# Patient Record
Sex: FEMALE | Race: WHITE | NOT HISPANIC OR LATINO | Employment: FULL TIME | URBAN - METROPOLITAN AREA
[De-identification: names, ages, dates, MRNs, and addresses within clinical notes are randomized per-mention and may not be internally consistent; named-entity substitution may affect disease eponyms.]

---
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
## 2022-01-07 DIAGNOSIS — Z11.59 SCREENING FOR VIRAL DISEASE: Primary | ICD-10-CM

## 2022-01-07 PROCEDURE — U0003 INFECTIOUS AGENT DETECTION BY NUCLEIC ACID (DNA OR RNA); SEVERE ACUTE RESPIRATORY SYNDROME CORONAVIRUS 2 (SARS-COV-2) (CORONAVIRUS DISEASE [COVID-19]), AMPLIFIED PROBE TECHNIQUE, MAKING USE OF HIGH THROUGHPUT TECHNOLOGIES AS DESCRIBED BY CMS-2020-01-R: HCPCS | Performed by: NURSE PRACTITIONER

## 2022-01-07 PROCEDURE — U0005 INFEC AGEN DETEC AMPLI PROBE: HCPCS | Performed by: NURSE PRACTITIONER

## 2022-01-11 ENCOUNTER — TELEMEDICINE (OUTPATIENT)
Dept: FAMILY MEDICINE CLINIC | Facility: CLINIC | Age: 22
End: 2022-01-11
Payer: COMMERCIAL

## 2022-01-11 VITALS — BODY MASS INDEX: 24.16 KG/M2 | WEIGHT: 145 LBS | HEIGHT: 65 IN

## 2022-01-11 DIAGNOSIS — F33.0 MILD EPISODE OF RECURRENT MAJOR DEPRESSIVE DISORDER (HCC): ICD-10-CM

## 2022-01-11 DIAGNOSIS — J32.9 SINUSITIS, UNSPECIFIED CHRONICITY, UNSPECIFIED LOCATION: Primary | ICD-10-CM

## 2022-01-11 DIAGNOSIS — F41.9 ANXIETY: ICD-10-CM

## 2022-01-11 PROCEDURE — 3008F BODY MASS INDEX DOCD: CPT | Performed by: NURSE PRACTITIONER

## 2022-01-11 PROCEDURE — 99213 OFFICE O/P EST LOW 20 MIN: CPT | Performed by: NURSE PRACTITIONER

## 2022-01-11 RX ORDER — AMOXICILLIN 875 MG/1
875 TABLET, COATED ORAL 2 TIMES DAILY
Qty: 14 TABLET | Refills: 0 | Status: SHIPPED | OUTPATIENT
Start: 2022-01-11 | End: 2022-01-17 | Stop reason: ALTCHOICE

## 2022-01-12 NOTE — PROGRESS NOTES
Virtual Regular Visit    Verification of patient location:    Patient is located in the following state in which I hold an active license [place]      Assessment/Plan:    Problem List Items Addressed This Visit        Other    Mild episode of recurrent major depressive disorder ([address] )    Anxiety      Other Visit Diagnoses     Sinusitis, unspecified chronicity, unspecified location    -  Primary    Relevant Medications    amoxicillin (AMOXIL) 875 mg tablet        Sinusitis:  Will start amoxicillin 875 mg twice daily for 7 days  Take this medication with food  Continue rest, fluids  She would like to return to work as soon as able, but we both agree she is not ready yet  She will update me in 2 days regarding how she is feeling in regards to return to work  She will call for any persisting or worsening of symptoms  Depression and anxiety are currently under good control on sertraline  Reason for visit is   Chief Complaint   Patient presents with    Cold Like Symptoms     headaches, congestion, cough, chills an sweats 1 + wk covid test neg   Virtual Regular Visit        Encounter provider ARELIS Bobby    Provider located at [address] [place] [id number]      Recent Visits  No visits were found meeting these conditions  Showing recent visits within past 7 days and meeting all other requirements  Today's Visits  Date Type Provider Dept   01/11/22 Telemedicine Ying Santana, [address] [place] [address] today's visits and meeting all other requirements  Future Appointments  No visits were found meeting these conditions  Showing future appointments within next 150 days and meeting all other requirements       The patient was identified by name and date of birth   Aureliano Friend was informed that this is a telemedicine visit and that the visit is being conducted through [facility] and patient was informed this is a secure, HIPAA-complaint platform  She agrees to proceed     My office door was closed  No one else was in the room  She acknowledged consent and understanding of privacy and security of the video platform  The patient has agreed to participate and understands they can discontinue the visit at any time  Patient is aware this is a billable service  Subjective    Fernanda Silva is a 22-year-old female presenting today for symptoms that began 6 days ago  COVID-19 swab was negative on January 7th  Symptoms include:  Headaches, cough, nasal congestion, sneezing, dizziness when 1st getting up, chills/sweats, sore throat, sinus pressure  She denies body aches, ear pain, loss of smell or taste, nausea, vomiting, diarrhea  Does not have a thermometer at home to take her temperature  Work is going well  Making a lot of new friends  Anxiety and depression are good on sertraline  Past Medical History:   Diagnosis Date    Heart murmur     In-toeing     Last assesseed 9/3/2014    Leg length discrepancy        Past Surgical History:   Procedure Laterality Date    TONSILLECTOMY         Current Outpatient Medications   Medication Sig Dispense Refill    hydrOXYzine HCL (ATARAX) 10 mg tablet Take 1 tablet (10 mg total) by mouth 3 (three) times a day as needed for anxiety 30 tablet 0    sertraline (ZOLOFT) 100 mg tablet Take 1 tablet (100 mg total) by mouth daily 90 tablet 1    amoxicillin (AMOXIL) 875 mg tablet Take 1 tablet (875 mg total) by mouth 2 (two) times a day for 7 days 14 tablet 0     No current facility-administered medications for this visit  Allergies   Allergen Reactions    Pollen Extract     Short Ragweed Pollen Ext        Review of Systems   Constitutional: Positive for chills, diaphoresis and fatigue  HENT: Positive for congestion, postnasal drip, rhinorrhea, sinus pressure, sneezing, sore throat and voice change  Negative for ear pain      Respiratory: Positive for cough  Negative for chest tightness, shortness of breath and wheezing  Cardiovascular: Negative  Gastrointestinal: Negative  Musculoskeletal: Negative for myalgias  Neurological: Positive for dizziness and headaches  Video Exam    Vitals:    01/11/22 1047   Weight: 65 8 kg (145 lb)   Height: 5' 5" (1 651 m)       Physical Exam  Vitals and nursing note reviewed  Constitutional:       Appearance: Normal appearance  Pulmonary:      Effort: Pulmonary effort is normal  No respiratory distress  Neurological:      Mental Status: She is alert  Psychiatric:         Mood and Affect: Mood normal           I spent 10 minutes directly with the patient during this visit    VIRTUAL VISIT Mark 459 verbally agrees to participate in [employer]  Pt is aware that [employer] could be limited without vital signs or the ability to perform a full hands-on physical exam  Pacheco Ross understands she or the provider may request at any time to terminate the video visit and request the patient to seek care or treatment in person

## 2022-01-13 DIAGNOSIS — J32.9 SINUSITIS, UNSPECIFIED CHRONICITY, UNSPECIFIED LOCATION: Primary | ICD-10-CM

## 2022-01-13 RX ORDER — PREDNISONE 10 MG/1
TABLET ORAL
Qty: 20 TABLET | Refills: 0 | Status: SHIPPED | OUTPATIENT
Start: 2022-01-13 | End: 2022-02-24

## 2022-01-14 ENCOUNTER — TELEPHONE (OUTPATIENT)
Dept: FAMILY MEDICINE CLINIC | Facility: CLINIC | Age: 22
End: 2022-01-14

## 2022-01-14 NOTE — TELEPHONE ENCOUNTER
Patient called, stated that she is feeling better she would like to return to work on Monday 1/17/22  She is requesting a note

## 2022-01-17 ENCOUNTER — TELEMEDICINE (OUTPATIENT)
Dept: FAMILY MEDICINE CLINIC | Facility: CLINIC | Age: 22
End: 2022-01-17
Payer: COMMERCIAL

## 2022-01-17 DIAGNOSIS — J32.9 SINUSITIS, UNSPECIFIED CHRONICITY, UNSPECIFIED LOCATION: Primary | ICD-10-CM

## 2022-01-17 PROCEDURE — 1036F TOBACCO NON-USER: CPT | Performed by: NURSE PRACTITIONER

## 2022-01-17 PROCEDURE — 99213 OFFICE O/P EST LOW 20 MIN: CPT | Performed by: NURSE PRACTITIONER

## 2022-01-17 RX ORDER — DOXYCYCLINE HYCLATE 100 MG/1
100 CAPSULE ORAL EVERY 12 HOURS SCHEDULED
Qty: 14 CAPSULE | Refills: 0 | Status: SHIPPED | OUTPATIENT
Start: 2022-01-17 | End: 2022-01-24

## 2022-01-17 NOTE — PROGRESS NOTES
Virtual Regular Visit    Verification of patient location:    Patient is located in the following state in which I hold an active license [place]      Assessment/Plan:    Problem List Items Addressed This Visit     None      Visit Diagnoses     Sinusitis, unspecified chronicity, unspecified location    -  Primary    Relevant Medications    doxycycline hyclate (VIBRAMYCIN) 100 mg capsule        Reji De Guzman is a 53-year-old female presenting today for sinusitis  Symptoms improved with amoxicillin and prednisone, but have not completely resolved  Will start doxycycline 100 mg twice daily for 7 days  She will call if symptoms are not significantly improving by the end of the week  Reason for visit is   Chief Complaint   Patient presents with    Unable to Smell    Facial Pain     When taking a deep breathe in     Virtual Regular Visit        Encounter provider [facility]    Provider located at [address] [place] [phone]      Recent Visits  Date Type Provider Dept   01/14/22 Telephone Ying Witt, [address]  [facility]    01/11/22 Telemedicine Ying Witt, [facility] recent visits within past 7 days and meeting all other requirements  Today's Visits  Date Type Provider Dept   01/17/22 Telemedicine Ying Witt, [facility] today's visits and meeting all other requirements  Future Appointments  No visits were found meeting these conditions  Showing future appointments within next 150 days and meeting all other requirements       The patient was identified by name and date of birth  Dorys Carolyn was informed that this is a telemedicine visit and that the visit is being conducted through [facility] and patient was informed this is a secure, HIPAA-complaint platform  She agrees to proceed     My office door was closed  No one else was in the room    She acknowledged consent and understanding of privacy and security of the video platform  The patient has agreed to participate and understands they can discontinue the visit at any time  Patient is aware this is a billable service  Itzel Olguin is a 25year old female presenting today for sinusitis follow up  Symptoms began January 5th  Today is day 12 of symptoms  Tested negative for COVID-19 via PCR on January 7th  See via telemedicine by myself on 1/11 and treated for sinusitis with amoxicillin  January 13th she called, with no improvement of symptoms  Started Prednisone taper  Cough, congestion, sore throat improved  Still has chills at times, no fever  Does have sinus pressure  Has sharp pain-"like needles" in nares and maxillary sinuses with inhalation  This is worse when she is active and moving around  Lost sense of smell  Not taking anything OTC  Past Medical History:   Diagnosis Date    Heart murmur     In-toeing     Last assesseed 9/3/2014    Leg length discrepancy        Past Surgical History:   Procedure Laterality Date    TONSILLECTOMY         Current Outpatient Medications   Medication Sig Dispense Refill    hydrOXYzine HCL (ATARAX) 10 mg tablet Take 1 tablet (10 mg total) by mouth 3 (three) times a day as needed for anxiety 30 tablet 0    predniSONE 10 mg tablet Take 4 tablets for 2 days, 3 tablets for 2 days, 2 tablets for 2 days, 1 tablet for 2 days  20 tablet 0    sertraline (ZOLOFT) 100 mg tablet Take 1 tablet (100 mg total) by mouth daily 90 tablet 1    doxycycline hyclate (VIBRAMYCIN) 100 mg capsule Take 1 capsule (100 mg total) by mouth every 12 (twelve) hours for 7 days 14 capsule 0     No current facility-administered medications for this visit  Allergies   Allergen Reactions    Pollen Extract     Short Ragweed Pollen Ext        Review of Systems   Constitutional: Positive for chills  Negative for diaphoresis, fatigue and fever     HENT: Positive for sinus pressure and sinus pain  Negative for congestion, ear pain, postnasal drip, sneezing and sore throat  Respiratory: Negative  Negative for cough, chest tightness, shortness of breath and wheezing  Cardiovascular: Negative  Musculoskeletal: Negative for myalgias  Neurological: Negative for dizziness and headaches  Video Exam    Vitals:       Physical Exam  Vitals and nursing note reviewed  Constitutional:       General: She is not in acute distress  Appearance: Normal appearance  She is not ill-appearing  HENT:      Head: Atraumatic  Pulmonary:      Effort: Pulmonary effort is normal  No respiratory distress  Comments: No cough  Neurological:      Mental Status: She is alert  Psychiatric:         Mood and Affect: Mood normal           I spent 8 minutes directly with the patient during this visit    VIRTUAL VISIT Mark Ozuna verbally agrees to participate in [employer]  Pt is aware that [employer] could be limited without vital signs or the ability to perform a full hands-on physical exam  Pacheco Morse understands she or the provider may request at any time to terminate the video visit and request the patient to seek care or treatment in person

## 2022-02-24 ENCOUNTER — OFFICE VISIT (OUTPATIENT)
Dept: FAMILY MEDICINE CLINIC | Facility: CLINIC | Age: 22
End: 2022-02-24
Payer: COMMERCIAL

## 2022-02-24 VITALS
HEART RATE: 117 BPM | TEMPERATURE: 97.6 F | RESPIRATION RATE: 18 BRPM | OXYGEN SATURATION: 97 % | HEIGHT: 65 IN | DIASTOLIC BLOOD PRESSURE: 80 MMHG | SYSTOLIC BLOOD PRESSURE: 112 MMHG | BODY MASS INDEX: 24.13 KG/M2

## 2022-02-24 DIAGNOSIS — L60.0 INGROWN TOENAIL OF RIGHT FOOT: Primary | ICD-10-CM

## 2022-02-24 PROCEDURE — 3008F BODY MASS INDEX DOCD: CPT | Performed by: NURSE PRACTITIONER

## 2022-02-24 PROCEDURE — 99213 OFFICE O/P EST LOW 20 MIN: CPT | Performed by: FAMILY MEDICINE

## 2022-02-24 RX ORDER — CEPHALEXIN 500 MG/1
500 CAPSULE ORAL 3 TIMES DAILY
Qty: 30 CAPSULE | Refills: 0 | Status: SHIPPED | OUTPATIENT
Start: 2022-02-24 | End: 2022-03-06

## 2022-02-24 NOTE — PROGRESS NOTES
FAMILY PRACTICE OFFICE VISIT       NAME: Rozina Lopez  AGE: 25 y o  SEX: female       : 2000        MRN: [id number]        Assessment and Plan     1  Ingrown toenail of right foot  -     Ambulatory referral to Podiatry; Future  -     cephalexin (KEFLEX) 500 mg capsule; Take 1 capsule (500 mg total) by mouth 3 (three) times a day for 10 days    Patient presents for evaluation of infected ingrown right big toenail  Patient will continue soaking  I advised her to elevate right foot  Work note provided  Start Keflex 500 mg t i d  for 7 to 10 days  Referral to Podiatry  There are no Patient Instructions on file for this visit  No follow-ups on file  Discussed with the patient and all questioned fully answered  She will call me if any problems arise  M*Modal software was used to dictate this note  It may contain errors with dictating incorrect words/spelling  Please contact provider directly with any questions  Chief Complaint     Chief Complaint   Patient presents with    Toe Pain     Right Great Toe X1 week    Covid-19 Vaccine     Booster due would like to discuss       History of Present Illness     Patient presents for evaluation of painful right big toe  Symptoms started a week ago  Patient states that she is standing at work all day long and is wearing rather heavy boots  She has try soaking her toe  No other arthralgias or myalgias  Patient is afebrile  Toe Pain         Review of Systems   Review of Systems   Constitutional: Negative  HENT: Negative  Eyes: Negative  Respiratory: Negative  Cardiovascular: Negative  Gastrointestinal: Negative  Endocrine: Negative  Genitourinary: Negative  Musculoskeletal: Positive for arthralgias  Skin: Positive for color change         Active Problem List     Patient Active Problem List   Diagnosis    History of asthma    Exercise-induced asthma    Mild episode of recurrent major depressive disorder ([facility] [address] )    Anxiety       Past Medical History:  Past Medical History:   Diagnosis Date    Heart murmur     In-toeing     Last assesseed 9/3/2014    Leg length discrepancy        Past Surgical History:  Past Surgical History:   Procedure Laterality Date    TONSILLECTOMY         Family History:  Family History   Problem Relation Age of Onset    Mental illness Mother         anxiety, depression    Hypothyroidism Mother     Hypertension Father     Coronary artery disease Father     Other Father         Hypoalbuminemia    Mental illness Sister         anxiety, depression    Autism spectrum disorder Brother     Heart attack Paternal Grandfather     Diabetes Paternal Grandfather     Diabetes Family     Heart disease Family     Obesity Family        Social History:  Social History     Socioeconomic History    Marital status: Single     Spouse name: Not on file    Number of children: Not on file    Years of education: Not on file    Highest education level: Not on file   Occupational History    Not on file   Tobacco Use    Smoking status: Never Smoker    Smokeless tobacco: Never Used   Vaping Use    Vaping Use: Never used   Substance and Sexual Activity    Alcohol use: No    Drug use: No    Sexual activity: Never   Other Topics Concern    Not on file   Social History Narrative    Exercises regularly     Social Determinants of Health     Financial Resource Strain: Not on file   Food Insecurity: Not on file   Transportation Needs: Not on file   Physical Activity: Not on file   Stress: Not on file   Social Connections: Not on file   Intimate Partner Violence: Not on file   Housing Stability: Not on file         Objective     Vitals:    02/24/22 1127   BP: 112/80   BP Location: Left arm   Patient Position: Sitting   Cuff Size: Standard   Pulse: (!) 117   Resp: 18   Temp: 97 6 °F (36 4 °C)   TempSrc: Temporal   SpO2: 97%   Height: 5' 5" (1 651 m)       Wt Readings from Last 3 Encounters:   01/11/22 65 8 kg (145 lb) (77 %, Z= 0 74)*   11/30/21 66 kg (145 lb 8 1 oz) (78 %, Z= 0 77)*   11/12/21 66 kg (145 lb 6 4 oz) (78 %, Z= 0 77)*     * Growth percentiles are based on [facility] (Girls, 2-20 Years) data  Physical Exam  Vitals and nursing note reviewed  Constitutional:       Appearance: Normal appearance  Skin:     Comments: Erythema and tenderness at lateral border of right big toe  Small pustular collection, probed with 23 gauge needle, no pus extruded  Ingrown toenail  Neurological:      General: No focal deficit present  Mental Status: She is alert and oriented to person, place, and time     Psychiatric:         Mood and Affect: Mood normal          Behavior: Behavior normal           Pertinent Laboratory/Diagnostic Studies:    Lab Results   Component Value Date    WBC 4 9 07/22/2021    HGB 13 0 07/22/2021    HCT 39 4 07/22/2021    MCV 90 2 07/22/2021     07/22/2021       Lab Results   Component Value Date    TSH 2 29 07/22/2021       No results found for: CHOL  No results found for: TRIG  No results found for: HDL  No results found for: [facility]  Lab Results   Component Value Date    HGBA1C 4 8 10/12/2020     Lab Results   Component Value Date    SODIUM 138 07/22/2021    K 4 4 07/22/2021     07/22/2021    CO2 27 07/22/2021    BUN 12 07/22/2021    CREATININE 0 62 07/22/2021    GLUC 85 07/22/2021    CALCIUM 9 6 07/22/2021    AST 26 07/22/2021    ALT 24 07/22/2021    ALKPHOS 88 07/22/2021    PROT 7 0 07/22/2016    TP 7 5 07/22/2021    BILITOT 0 5 07/22/2016    TBILI 0 5 07/22/2021       Orders Placed This Encounter   Procedures    Ambulatory referral to Podiatry       ALLERGIES:  Allergies   Allergen Reactions    Pollen Extract     Short Ragweed Pollen Ext        Current Medications     Current Outpatient Medications   Medication Sig Dispense Refill    hydrOXYzine HCL (ATARAX) 10 mg tablet Take 1 tablet (10 mg total) by mouth 3 (three) times a day as needed for anxiety 30 tablet 0    sertraline (ZOLOFT) 100 mg tablet Take 1 tablet (100 mg total) by mouth daily 90 tablet 1    cephalexin (KEFLEX) 500 mg capsule Take 1 capsule (500 mg total) by mouth 3 (three) times a day for 10 days 30 capsule 0     No current facility-administered medications for this visit         Medications Discontinued During This Encounter   Medication Reason    predniSONE 10 mg tablet        Health Maintenance     Health Maintenance   Topic Date Due    Hepatitis C Screening  Never done    Pneumococcal Vaccine: Pediatrics (0 to 5 Years) and At-Risk Patients (6 to 59 Years) (1 of 2 - PPSV23) 11/26/2008    HIV Screening  Never done    Annual Physical  09/25/2021    COVID-19 Vaccine (3 - Booster for Pfizer series) 12/10/2021    BMI: Adult  01/11/2023    Depression Remission PHQ  02/24/2023    DTaP,Tdap,and Td Vaccines (7 - Td or Tdap) 09/05/2024    HIB Vaccine  Completed    Hepatitis B Vaccine  Completed    IPV Vaccine  Completed    Hepatitis A Vaccine  Completed    Meningococcal ACWY Vaccine  Completed    Influenza Vaccine  Completed    HPV Vaccine  Completed       Immunization History   Administered Date(s) Administered    COVID-19 PFIZER VACCINE 0 3 ML IM 06/19/2021, 07/10/2021    DTaP 02/05/2003, 03/26/2003, 06/07/2003, 03/01/2004, 01/22/2007    DTaP 5 02/05/2003, 03/26/2003, 06/07/2003, 03/01/2004, 01/22/2007    HPV Quadrivalent 09/05/2014, 11/18/2014, 03/23/2015    Hep A, adult 09/12/2013, 09/05/2014    Hep B, Adolescent or Pediatric 2002, 01/06/2003, 06/07/2003    Hep B, adult 2002, 01/06/2003, 06/07/2003    Hepatitis A 09/12/2013    HiB 02/05/2003, 03/26/2003, 06/07/2003, 03/01/2004    Hib (PRP-OMP) 02/05/2003, 03/26/2003, 06/07/2003, 03/01/2004    INFLUENZA 11/13/2008, 11/17/2010, 09/22/2011, 10/23/2017    IPV 02/05/2003, 03/26/2003, 06/07/2003, 01/22/2007    Influenza Quadrivalent Preservative Free 3 years and older IM 09/21/2016, 10/23/2017  Influenza, injectable, quadrivalent, preservative free 0 5 mL 10/09/2018, 09/23/2019, 09/25/2020, 09/23/2021    Influenza, seasonal, injectable 11/13/2008, 11/17/2010, 09/22/2011, 11/18/2014, 10/27/2015    MMR 12/16/2003, 01/22/2007    Meningococcal MCV4P 04/19/2019    Meningococcal, Unknown Serogroups 09/05/2014    Pneumococcal Conjugate PCV 7 02/05/2003, 03/26/2003, 06/07/2003, 12/16/2003    Tdap 09/05/2014    Varicella 12/16/2003, 02/15/2008       Benny Jalloh MD

## 2022-03-22 DIAGNOSIS — F41.9 ANXIETY: ICD-10-CM

## 2022-03-22 DIAGNOSIS — F33.0 MILD EPISODE OF RECURRENT MAJOR DEPRESSIVE DISORDER (HCC): ICD-10-CM

## 2022-03-22 RX ORDER — SERTRALINE HYDROCHLORIDE 100 MG/1
100 TABLET, FILM COATED ORAL DAILY
Qty: 90 TABLET | Refills: 0 | Status: SHIPPED | OUTPATIENT
Start: 2022-03-22 | End: 2022-03-22 | Stop reason: SDUPTHER

## 2022-03-22 RX ORDER — SERTRALINE HYDROCHLORIDE 100 MG/1
100 TABLET, FILM COATED ORAL DAILY
Qty: 90 TABLET | Refills: 0 | Status: SHIPPED | OUTPATIENT
Start: 2022-03-22 | End: 2022-05-17 | Stop reason: SDUPTHER

## 2022-03-31 ENCOUNTER — OFFICE VISIT (OUTPATIENT)
Dept: FAMILY MEDICINE CLINIC | Facility: CLINIC | Age: 22
End: 2022-03-31
Payer: COMMERCIAL

## 2022-03-31 VITALS
HEIGHT: 65 IN | DIASTOLIC BLOOD PRESSURE: 70 MMHG | RESPIRATION RATE: 16 BRPM | WEIGHT: 142 LBS | BODY MASS INDEX: 23.66 KG/M2 | OXYGEN SATURATION: 98 % | HEART RATE: 95 BPM | TEMPERATURE: 97.8 F | SYSTOLIC BLOOD PRESSURE: 100 MMHG

## 2022-03-31 DIAGNOSIS — K52.9 GASTROENTERITIS: Primary | ICD-10-CM

## 2022-03-31 PROBLEM — Z87.09 HISTORY OF ASTHMA: Status: RESOLVED | Noted: 2019-02-26 | Resolved: 2022-03-31

## 2022-03-31 PROCEDURE — 99213 OFFICE O/P EST LOW 20 MIN: CPT | Performed by: FAMILY MEDICINE

## 2022-03-31 PROCEDURE — 3008F BODY MASS INDEX DOCD: CPT | Performed by: FAMILY MEDICINE

## 2022-03-31 PROCEDURE — 1036F TOBACCO NON-USER: CPT | Performed by: FAMILY MEDICINE

## 2022-03-31 RX ORDER — ONDANSETRON 4 MG/1
4 TABLET, FILM COATED ORAL EVERY 6 HOURS PRN
Qty: 20 TABLET | Refills: 1 | Status: SHIPPED | OUTPATIENT
Start: 2022-03-31

## 2022-03-31 NOTE — PROGRESS NOTES
Chief Complaint   Patient presents with    Abdominal Pain     3 + days    Fatigue     3 + days        HPI   Here with 3 days of funky Nissen her stomach  Some nausea  Has not vomited  No fever or diarrhea  Other complaint is fatigue  Head feels fuzzy  A bit of pressure in her head  Not headache  Has missed 2 days of work  Past Medical History:   Diagnosis Date    Heart murmur     In-toeing     Leg length discrepancy         Past Surgical History:   Procedure Laterality Date    TONSILLECTOMY         Social History     Tobacco Use    Smoking status: Never Smoker    Smokeless tobacco: Never Used   Substance Use Topics    Alcohol use: No       Social History     Social History Narrative    Lives at home with parents  One brother  Works at [employer]  [employer]  Has 2 dogs  The following portions of the patient's history were reviewed and updated as appropriate: allergies, current medications, past family history, past medical history, past social history, past surgical history and problem list       Review of Systems       /70   Pulse 95   Temp 97 8 °F (36 6 °C) (Temporal)   Resp 16   Ht 5' 5" (1 651 m)   Wt 64 4 kg (142 lb)   LMP 12/03/2021 (Exact Date)   SpO2 98%   BMI 23 63 kg/m²      Physical Exam   Appears well  Both eardrums are white  Throat reveals no erythema  Lungs are clear  Heart regular  Abdomen soft and nontender with normal bowel sounds  No rash noted                Current Outpatient Medications:     hydrOXYzine HCL (ATARAX) 10 mg tablet, Take 1 tablet (10 mg total) by mouth 3 (three) times a day as needed for anxiety, Disp: 30 tablet, Rfl: 0    sertraline (ZOLOFT) 100 mg tablet, Take 1 tablet (100 mg total) by mouth daily, Disp: 90 tablet, Rfl: 0    ondansetron (ZOFRAN) 4 mg tablet, Take 1 tablet (4 mg total) by mouth every 6 (six) hours as needed for nausea or vomiting, Disp: 20 tablet, Rfl: 1     No problem-specific Assessment & Plan notes found for this encounter  Diagnoses and all orders for this visit:    Gastroenteritis  -     ondansetron (ZOFRAN) 4 mg tablet; Take 1 tablet (4 mg total) by mouth every 6 (six) hours as needed for nausea or vomiting        Patient Instructions   Has a viral illness, possibly gastroenteritis  Ondansetron may be used every 4 hours if needed for nausea  Clear liquids and gradually advance diet as tolerated  Avoid milk products for a couple days  Tylenol or ibuprofen for aches and pains  The fatigue needs to run its course  No to return to work tomorrow if she is feeling better  Return as needed

## 2022-03-31 NOTE — PATIENT INSTRUCTIONS
Has a viral illness, possibly gastroenteritis  Ondansetron may be used every 4 hours if needed for nausea  Clear liquids and gradually advance diet as tolerated  Avoid milk products for a couple days  Tylenol or ibuprofen for aches and pains  The fatigue needs to run its course  No to return to work tomorrow if she is feeling better  Return as needed

## 2022-03-31 NOTE — LETTER
March 31, 2022     Patient: Bernadette Velez   YOB: 2000   Date of Visit: 3/31/2022       To Whom it May Concern:    Zaira Lakhani is under my professional care  She was seen in my office on 3/31/2022   Please excuse from work 3/30-31  May return to work 4/1 if feeling better  If you have any questions or concerns, please don't hesitate to call           Sincerely,          Kirsty Estrella MD        CC: No Recipients

## 2022-05-17 DIAGNOSIS — F41.9 ANXIETY: ICD-10-CM

## 2022-05-17 DIAGNOSIS — F33.0 MILD EPISODE OF RECURRENT MAJOR DEPRESSIVE DISORDER (HCC): ICD-10-CM

## 2022-05-17 RX ORDER — SERTRALINE HYDROCHLORIDE 100 MG/1
100 TABLET, FILM COATED ORAL DAILY
Qty: 90 TABLET | Refills: 1 | Status: SHIPPED | OUTPATIENT
Start: 2022-05-17

## 2022-11-17 ENCOUNTER — CLINICAL SUPPORT (OUTPATIENT)
Dept: FAMILY MEDICINE CLINIC | Facility: CLINIC | Age: 22
End: 2022-11-17

## 2022-11-17 DIAGNOSIS — Z20.822 CLOSE EXPOSURE TO COVID-19 VIRUS: Primary | ICD-10-CM

## 2022-11-18 LAB — SARS-COV-2 RNA RESP QL NAA+PROBE: NEGATIVE

## 2022-11-25 ENCOUNTER — CLINICAL SUPPORT (OUTPATIENT)
Dept: FAMILY MEDICINE CLINIC | Facility: CLINIC | Age: 22
End: 2022-11-25

## 2022-11-25 DIAGNOSIS — B34.9 VIRAL INFECTION, UNSPECIFIED: Primary | ICD-10-CM

## 2022-11-26 LAB — SARS-COV-2 RNA RESP QL NAA+PROBE: NEGATIVE

## 2022-12-27 ENCOUNTER — OFFICE VISIT (OUTPATIENT)
Dept: URGENT CARE | Facility: CLINIC | Age: 22
End: 2022-12-27

## 2022-12-27 VITALS
WEIGHT: 160 LBS | TEMPERATURE: 99.9 F | HEART RATE: 104 BPM | OXYGEN SATURATION: 99 % | BODY MASS INDEX: 26.66 KG/M2 | HEIGHT: 65 IN | RESPIRATION RATE: 18 BRPM

## 2022-12-27 DIAGNOSIS — R05.1 ACUTE COUGH: Primary | ICD-10-CM

## 2022-12-27 RX ORDER — BENZONATATE 200 MG/1
200 CAPSULE ORAL 3 TIMES DAILY PRN
Qty: 20 CAPSULE | Refills: 0 | Status: SHIPPED | OUTPATIENT
Start: 2022-12-27

## 2022-12-27 RX ORDER — ALBUTEROL SULFATE 90 UG/1
2 AEROSOL, METERED RESPIRATORY (INHALATION) EVERY 6 HOURS PRN
Qty: 8 G | Refills: 0 | Status: SHIPPED | OUTPATIENT
Start: 2022-12-27

## 2022-12-27 RX ORDER — FLUTICASONE PROPIONATE 50 MCG
2 SPRAY, SUSPENSION (ML) NASAL DAILY
Qty: 16 G | Refills: 0 | Status: SHIPPED | OUTPATIENT
Start: 2022-12-27

## 2022-12-27 NOTE — PROGRESS NOTES
[facility]        NAME: Jovany Motta is a 23 y o  female  : 2000    MRN: [id number]  DATE: 2022  TIME: 8:09 AM    Assessment and Plan   Acute cough [R05 1]  1  Acute cough  Cov/Flu-Col[facility] at [facility]    albuterol (PROVENTIL HFA,VENTOLIN HFA) 90 mcg/act inhaler    benzonatate (TESSALON) 200 MG capsule    fluticasone (FLONASE) 50 mcg/act nasal spray            Patient Instructions     Use medications as directed for symptomatic relief as needed  Return and/or Tylenol as needed for fever or pain  Follow up with PCP in 3-5 days  Proceed to  ER if symptoms worsen  Chief Complaint     Chief Complaint   Patient presents with   • Cough     Pt presents with feeling run down and tired, post nasal drip, nasal congestion, and ear fullness on the right side only x 2 days  Pt states her mother currently has the flu and brother had last week  History of Present Illness       24-year-old female presents with 2-day history of body aches fatigue nasal drip with congestion ear discomfort mild cough  Patient stated mother and brother have flu recently  Denies any chest pain shortness of breath  No abdominal pain nausea vomiting or diarrhea  Cough  This is a new problem  The current episode started yesterday  The problem has been unchanged  The problem occurs every few minutes  The cough is non-productive  Associated symptoms include ear congestion, ear pain, myalgias, nasal congestion, postnasal drip, rhinorrhea and a sore throat  Pertinent negatives include no chest pain, chills, fever or shortness of breath  The symptoms are aggravated by lying down  She has tried OTC cough suppressant for the symptoms  The treatment provided no relief  Review of Systems   Review of Systems   Constitutional: Negative  Negative for chills and fever  HENT: Positive for ear pain, postnasal drip, rhinorrhea and sore throat  Eyes: Negative      Respiratory: Positive for cough  Negative for shortness of breath  Cardiovascular: Negative  Negative for chest pain  Gastrointestinal: Negative  Musculoskeletal: Positive for myalgias  Skin: Negative  Neurological: Negative            Current Medications       Current Outpatient Medications:   •  albuterol (PROVENTIL HFA,VENTOLIN HFA) 90 mcg/act inhaler, Inhale 2 puffs every 6 (six) hours as needed for wheezing or shortness of breath, Disp: 8 g, Rfl: 0  •  benzonatate (TESSALON) 200 MG capsule, Take 1 capsule (200 mg total) by mouth 3 (three) times a day as needed for cough, Disp: 20 capsule, Rfl: 0  •  fluticasone (FLONASE) 50 mcg/act nasal spray, 2 sprays into each nostril daily, Disp: 16 g, Rfl: 0  •  hydrOXYzine HCL (ATARAX) 10 mg tablet, Take 1 tablet (10 mg total) by mouth 3 (three) times a day as needed for anxiety, Disp: 30 tablet, Rfl: 0  •  sertraline (ZOLOFT) 100 mg tablet, Take 1 tablet (100 mg total) by mouth in the morning , Disp: 90 tablet, Rfl: 1  •  ondansetron (ZOFRAN) 4 mg tablet, Take 1 tablet (4 mg total) by mouth every 6 (six) hours as needed for nausea or vomiting, Disp: 20 tablet, Rfl: 1    Current Allergies     Allergies as of 12/27/2022 - Reviewed 12/27/2022   Allergen Reaction Noted   • Pollen extract  09/05/2014   • Short ragweed pollen ext  09/05/2014            The following portions of the patient's history were reviewed and updated as appropriate: allergies, current medications, past family history, past medical history, past social history, past surgical history and problem list      Past Medical History:   Diagnosis Date   • Heart murmur    • In-toeing    • Leg length discrepancy        Past Surgical History:   Procedure Laterality Date   • TONSILLECTOMY         Family History   Problem Relation Age of Onset   • Mental illness Mother         anxiety, depression   • Hypothyroidism Mother    • Hypertension Father    • Coronary artery disease Father    • Other Father         Hypoalbuminemia • Mental illness Sister         anxiety, depression   • Autism spectrum disorder Brother    • Heart attack Paternal Grandfather    • Diabetes Paternal Grandfather    • Diabetes Family    • Heart disease Family    • Obesity Family          Medications have been verified  Objective   Pulse 104   Temp 99 9 °F (37 7 °C)   Resp 18   Ht 5' 5" (1 651 m)   Wt 72 6 kg (160 lb)   SpO2 99%   BMI 26 63 kg/m²   No LMP recorded  Physical Exam     Physical Exam  Vitals and nursing note reviewed  Constitutional:       General: She is not in acute distress  Appearance: Normal appearance  She is well-developed  She is ill-appearing  HENT:      Head: Normocephalic and atraumatic  Right Ear: Hearing, tympanic membrane, ear canal and external ear normal  There is no impacted cerumen  Left Ear: Hearing, tympanic membrane, ear canal and external ear normal  There is no impacted cerumen  Nose: Congestion and rhinorrhea present  Mouth/Throat:      Pharynx: Uvula midline  Posterior oropharyngeal erythema (Mild posterior) present  No oropharyngeal exudate  Eyes:      General:         Right eye: No discharge  Left eye: No discharge  Conjunctiva/sclera: Conjunctivae normal    Cardiovascular:      Rate and Rhythm: Normal rate and regular rhythm  Heart sounds: Normal heart sounds  No murmur heard  Pulmonary:      Effort: Pulmonary effort is normal  No respiratory distress  Breath sounds: Normal breath sounds  No wheezing or rales  Abdominal:      General: Bowel sounds are normal       Palpations: Abdomen is soft  Tenderness: There is no abdominal tenderness  Musculoskeletal:         General: Normal range of motion  Cervical back: Normal range of motion and neck supple  Lymphadenopathy:      Cervical: No cervical adenopathy  Skin:     General: Skin is warm and dry  Neurological:      Mental Status: She is alert and oriented to person, place, and time  Psychiatric:         Mood and Affect: Mood normal

## 2022-12-27 NOTE — LETTER
December 27, 2022     Patient: Sol Mendoza   YOB: 2000   Date of Visit: 12/27/2022       To Whom it May Concern:    Allyson Krishnan was seen in my clinic on 12/27/2022  She may return to work on 12/31/2022  Patient may return sooner if symptoms have improved and no fevers for 24 hours  If you have any questions or concerns, please don't hesitate to call           Sincerely,          Tone Stratton, PATomasC        CC: No Recipients

## 2022-12-27 NOTE — PATIENT INSTRUCTIONS
Use medications as directed for symptomatic relief as needed  Return and/or Tylenol as needed for fever or pain  Follow up with PCP in 3-5 days  Proceed to  ER if symptoms worsen  Influenza   AMBULATORY CARE:   Influenza  (the flu) is an infection caused by the influenza virus  The flu is easily spread when an infected person coughs, sneezes, or has close contact with others  You may be able to spread the flu to others for 1 week or longer after signs or symptoms appear  Common signs and symptoms include the following:   Fever and chills    Headaches, body aches, and muscle or joint pain    Cough, runny nose, and sore throat    Loss of appetite, nausea, vomiting, or diarrhea    Tiredness    Trouble breathing    Call your local emergency number (911 in the [address] [facility]) if:   You have trouble breathing, and your lips look purple or blue  You have a seizure  Seek care immediately if:   You are dizzy, or you are urinating less or not at all  You have a headache with a stiff neck, and you feel tired or confused  You have new pain or pressure in your chest     Your symptoms, such as shortness of breath, vomiting, or diarrhea, get worse  Your symptoms, such as fever and coughing, seem to get better, but then get worse  Call your doctor if:   You have new muscle pain or weakness  You have questions or concerns about your condition or care  Treatment for influenza  may include any of the following:  Acetaminophen  decreases pain and fever  It is available without a doctor's order  Ask how much to take and how often to take it  Follow directions  Read the labels of all other medicines you are using to see if they also contain acetaminophen, or ask your doctor or pharmacist  Acetaminophen can cause liver damage if not taken correctly  Do not use more than 4 grams (4,000 milligrams) total of acetaminophen in one day  NSAIDs , such as ibuprofen, help decrease swelling, pain, and fever   This medicine is available with or without a doctor's order  NSAIDs can cause stomach bleeding or kidney problems in certain people  If you take blood thinner medicine, always ask your healthcare provider if NSAIDs are safe for you  Always read the medicine label and follow directions  Antivirals  help fight a viral infection  Manage your symptoms:   Rest  as much as you can to help you recover  Drink liquids as directed  to help prevent dehydration  Ask how much liquid to drink each day and which liquids are best for you  Prevent the spread of germs:       Wash your hands often  Wash your hands several times each day  Wash after you use the bathroom, change a child's diaper, and before you prepare or eat food  Use soap and water every time  Rub your soapy hands together, lacing your fingers  Wash the front and back of your hands, and in between your fingers  Use the fingers of one hand to scrub under the fingernails of the other hand  Wash for at least 20 seconds  Rinse with warm, running water for several seconds  Then dry your hands with a clean towel or paper towel  Use hand  that contains alcohol if soap and water are not available  Do not touch your eyes, nose, or mouth without washing your hands first          Cover a sneeze or cough  Use a tissue that covers your mouth and nose  Throw the tissue away in a trash can right away  Use the bend of your arm if a tissue is not available  Wash your hands well with soap and water or use a hand   Stay away from others while you are sick  Avoid crowds as much as possible  Ask about vaccines you may need  Talk to your healthcare provider about your vaccine history  He or she will tell you which vaccines you need, and when to get them  Get the influenza (flu) vaccine as soon as it is available  Flu viruses change, so it is important to get a flu vaccine every year  Get the pneumonia vaccine if recommended    This vaccine is usually recommended every 5 years  Your provider will tell you when to get this vaccine, if needed  Follow up with your doctor as directed:  Write down your questions so you remember to ask them during your visits  © Copyright [employer] 2022 Information is for End User's use only and may not be sold, redistributed or otherwise used for commercial purposes  All illustrations and images included in CareNotes® are the copyrighted property of A D A M , Inc  or [facility] [employer]   The above information is an  only  It is not intended as medical advice for individual conditions or treatments  Talk to your doctor, nurse or pharmacist before following any medical regimen to see if it is safe and effective for you

## 2022-12-28 ENCOUNTER — DOCUMENTATION (OUTPATIENT)
Dept: URGENT CARE | Facility: MEDICAL CENTER | Age: 22
End: 2022-12-28

## 2022-12-28 ENCOUNTER — TELEPHONE (OUTPATIENT)
Dept: URGENT CARE | Facility: MEDICAL CENTER | Age: 22
End: 2022-12-28

## 2022-12-28 LAB
FLUAV RNA RESP QL NAA+PROBE: POSITIVE
FLUBV RNA RESP QL NAA+PROBE: NEGATIVE
SARS-COV-2 RNA RESP QL NAA+PROBE: NEGATIVE

## 2023-09-25 ENCOUNTER — OFFICE VISIT (OUTPATIENT)
Dept: FAMILY MEDICINE CLINIC | Facility: CLINIC | Age: 23
End: 2023-09-25
Payer: COMMERCIAL

## 2023-09-25 VITALS
SYSTOLIC BLOOD PRESSURE: 124 MMHG | HEART RATE: 88 BPM | DIASTOLIC BLOOD PRESSURE: 82 MMHG | BODY MASS INDEX: 23.72 KG/M2 | OXYGEN SATURATION: 99 % | TEMPERATURE: 98.4 F | HEIGHT: 65 IN | RESPIRATION RATE: 16 BRPM | WEIGHT: 142.4 LBS

## 2023-09-25 DIAGNOSIS — L60.0 INGROWN TOENAIL OF LEFT FOOT: Primary | ICD-10-CM

## 2023-09-25 DIAGNOSIS — H69.91 EUSTACHIAN TUBE DYSFUNCTION, RIGHT: ICD-10-CM

## 2023-09-25 DIAGNOSIS — M54.9 UPPER BACK PAIN: ICD-10-CM

## 2023-09-25 PROCEDURE — 99214 OFFICE O/P EST MOD 30 MIN: CPT | Performed by: NURSE PRACTITIONER

## 2023-09-25 RX ORDER — CEPHALEXIN 500 MG/1
500 CAPSULE ORAL EVERY 8 HOURS SCHEDULED
Qty: 21 CAPSULE | Refills: 0 | Status: SHIPPED | OUTPATIENT
Start: 2023-09-25 | End: 2023-10-02

## 2023-09-25 NOTE — PROGRESS NOTES
FAMILY PRACTICE OFFICE VISIT       NAME: Lane Cruz  AGE: 23 y.o. SEX: female       : 2000        MRN: [id number]    Assessment and Plan   1. Ingrown toenail of left foot  Warm soaks, Epsom Salts 2 times per day. Lift nail away from nailbed, twice daily. Keflex 500 mg TID for 7 days. Call if not improving over the next 5 days. -     cephalexin (KEFLEX) 500 mg capsule; Take 1 capsule (500 mg total) by mouth every 8 (eight) hours for 7 days    2. Eustachian tube dysfunction, right  Start Flonase nasal spray 2 sprays each nostril daily. Start daily OTC antihistamine Claritin or Zyrtec 10 mg daily. Call if not improving over the next 2 weeks. 3. Upper back pain  Likely from working in one position for 8 hours 5 days per week. Then coming home and being in same position at home. Needs to get regular exercise, vary activities, and may use heat as needed. We discussed PT, not sure she wants to do this right now. Will try stretching, regular exercise, heat, if not improving she will let me know. Chief Complaint     Chief Complaint   Patient presents with   • Toe Pain     Possible infection- big toe noticed last Monday   • Hearing Problem     Right ear feels clogged, awhile    • Back Pain     Upper pain, when moving certain way feels pain       History of Present Illness       Lane Cruz is a 23year old female presenting today for several concerns. Left great toe infection. Started one week ago. Pus, red, hurts with weight bearing. Has not soaked foot or applied antibiotic ointment. Possible ingrown nail. Right ear, last few months, feels blocked. Sometimes feels like liquid in ear. Sometimes hearing is decreased. Works with loud noises. Not using hearing protection. Has mild seasonal allergies. Back pain, not sure how long. Both sides, upper back pain, laying flat hurts, or bending over to pick something up hurts. Feels Tight. Working- leaning over for 8 hours. 5 days per week. 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 Sometimes does other jobs where she has more motion, but mostly does this task. 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 Accompanied by dad today. Review of Systems   Review of Systems   Constitutional: Negative. HENT:        Right ear as noted in HPI   Musculoskeletal: Positive for back pain. Skin:        Left great toe as noted in HPI       I have reviewed the patient's medical history in detail; there are no changes to the history as noted in the electronic medical record. Objective     Vitals:    09/25/23 1213 09/25/23 1301   BP: 124/82    BP Location: Left arm    Patient Position: Sitting    Cuff Size: Standard    Pulse: (!) 110 88   Resp: 16    Temp: 98.4 °F (36.9 °C)    TempSrc: Temporal    SpO2: 99%    Weight: 64.6 kg (142 lb 6.4 oz)    Height: 5' 5" (1.651 m)      Wt Readings from Last 3 Encounters:   09/25/23 64.6 kg (142 lb 6.4 oz)   12/27/22 72.6 kg (160 lb)   03/31/22 64.4 kg (142 lb) (73 %, Z= 0.61)*     * Growth percentiles are based on CDC (Girls, 2-20 Years) data. Physical Exam  Vitals and nursing note reviewed. Constitutional:       General: She is not in acute distress. Appearance: Normal appearance. She is not ill-appearing. HENT:      Right Ear: Tympanic membrane and ear canal normal.      Left Ear: Tympanic membrane and ear canal normal.   Cardiovascular:      Rate and Rhythm: Normal rate and regular rhythm. Heart sounds: No murmur heard. Pulmonary:      Effort: Pulmonary effort is normal.      Breath sounds: Normal breath sounds. Musculoskeletal:      Cervical back: Normal.      Thoracic back: Normal.   Skin:     General: Skin is warm and dry. Comments: Left great toe nail, in grown nail, red, tender, swollen, no drainage noted at this time. Neurological:      Mental Status: She is alert. Psychiatric:         Mood and Affect: Mood normal.            ALLERGIES:  Allergies   Allergen Reactions   • Pollen Extract    • Short Ragweed Pollen Ext        Current Medications     Current Outpatient Medications   Medication Sig Dispense Refill   • albuterol (PROVENTIL HFA,VENTOLIN HFA) 90 mcg/act inhaler Inhale 2 puffs every 6 (six) hours as needed for wheezing or shortness of breath 8 g 0   • cephalexin (KEFLEX) 500 mg capsule Take 1 capsule (500 mg total) by mouth every 8 (eight) hours for 7 days 21 capsule 0   • fluticasone (FLONASE) 50 mcg/act nasal spray 2 sprays into each nostril daily 16 g 0   • hydrOXYzine HCL (ATARAX) 10 mg tablet Take 1 tablet (10 mg total) by mouth 3 (three) times a day as needed for anxiety 30 tablet 0     No current facility-administered medications for this visit.          Health Maintenance     Health Maintenance   Topic Date Due   • Hepatitis C Screening  Never done   • Pneumococcal Vaccine: Pediatrics (0 to 5 Years) and At-Risk Patients (6 to 59 Years) (1 - PCV) 11/26/2008   • HIV Screening  Never done   • COVID-19 Vaccine (3 - Pfizer series) 09/04/2021   • Annual Physical  09/25/2021   • Influenza Vaccine (1) 09/01/2023   • Depression Remission PHQ  03/25/2024   • DTaP,Tdap,and Td Vaccines (7 - Td or Tdap) 09/05/2024   • BMI: Adult  09/25/2024   • HIB Vaccine  Completed   • IPV Vaccine  Completed   • Hepatitis A Vaccine  Completed   • Meningococcal ACWY Vaccine  Completed   • HPV Vaccine  Completed     Immunization History   Administered Date(s) Administered   • COVID-19 PFIZER VACCINE 0.3 ML IM 06/19/2021, 07/10/2021   • DTaP 02/05/2003, 03/26/2003, 06/07/2003, 03/01/2004, 01/22/2007   • DTaP 5 02/05/2003, 03/26/2003, 06/07/2003, 03/01/2004, 01/22/2007   • HPV Quadrivalent 09/05/2014, 11/18/2014, 03/23/2015   • Hep A, adult 09/12/2013, 09/05/2014   • Hep B, Adolescent or Pediatric 2002, 01/06/2003, 06/07/2003   • Hep B, adult 2000, 01/06/2003, 06/07/2003   • Hepatitis A 09/12/2013   • HiB 02/05/2003, 03/26/2003, 06/07/2003, 03/01/2004   • Hib (PRP-OMP) 02/05/2003, 03/26/2003, 06/07/2003, 03/01/2004   • INFLUENZA 11/13/2008, 11/17/2010, 09/22/2011, 10/23/2017   • IPV 02/05/2003, 03/26/2003, 06/07/2003, 01/22/2007   • Influenza Quadrivalent Preservative Free 3 years and older IM 09/21/2016, 10/23/2017   • Influenza, injectable, quadrivalent, preservative free 0.5 mL 10/09/2018, 09/23/2019, 09/25/2020, 09/23/2021   • Influenza, seasonal, injectable 11/13/2008, 11/17/2010, 09/22/2011, 11/18/2014, 10/27/2015   • MMR 12/16/2003, 01/22/2007   • Meningococcal MCV4P 04/19/2019   • Meningococcal, Unknown Serogroups 09/05/2014   • Pneumococcal Conjugate PCV 7 02/05/2003, 03/26/2003, 06/07/2003, 12/16/2003   • Tdap 09/05/2014   • Varicella 12/16/2003, 02/15/2008       ARELIS Allen

## 2023-12-21 ENCOUNTER — RA CDI HCC (OUTPATIENT)
Dept: OTHER | Facility: HOSPITAL | Age: 23
End: 2023-12-21

## 2023-12-28 ENCOUNTER — OFFICE VISIT (OUTPATIENT)
Dept: FAMILY MEDICINE CLINIC | Facility: CLINIC | Age: 23
End: 2023-12-28
Payer: COMMERCIAL

## 2023-12-28 VITALS
DIASTOLIC BLOOD PRESSURE: 80 MMHG | SYSTOLIC BLOOD PRESSURE: 110 MMHG | HEART RATE: 115 BPM | OXYGEN SATURATION: 98 % | BODY MASS INDEX: 23.16 KG/M2 | RESPIRATION RATE: 16 BRPM | TEMPERATURE: 97.8 F | HEIGHT: 65 IN | WEIGHT: 139 LBS

## 2023-12-28 DIAGNOSIS — Z12.4 CERVICAL CANCER SCREENING: ICD-10-CM

## 2023-12-28 DIAGNOSIS — Z30.09 COUNSELING FOR BIRTH CONTROL, ORAL CONTRACEPTIVES: ICD-10-CM

## 2023-12-28 DIAGNOSIS — Z00.00 PHYSICAL EXAM, ANNUAL: Primary | ICD-10-CM

## 2023-12-28 PROCEDURE — 99395 PREV VISIT EST AGE 18-39: CPT | Performed by: NURSE PRACTITIONER

## 2023-12-28 RX ORDER — LEVONORGESTREL AND ETHINYL ESTRADIOL 0.15-0.03
1 KIT ORAL DAILY
Qty: 90 TABLET | Refills: 3 | Status: SHIPPED | OUTPATIENT
Start: 2023-12-28

## 2023-12-28 NOTE — PROGRESS NOTES
[facility] HEALTH MAINTENANCE OFFICE VISIT  [facility] - [facility]    NAME: Pacheco Willis  AGE: 23 y.o. SEX: female  : 2000     DATE: 2023    Assessment and Plan     1. Physical exam, annual    2. Cervical cancer screening  -     Ambulatory Referral to Obstetrics / Gynecology; Future    3. Counseling for birth control, oral contraceptives  She is aware how to take medication. Aware of side effect profile, has taken OCP in the past.   Will contact me with any mood changes.   Follow up in 3 months.   Discussed OCP protects from pregnancy, does not protect from STIs. Condoms are recommended.   -     levonorgestrel-ethinyl estradiol (Jolessa) 0.15-0.03 MG per tablet; Take 1 tablet by mouth daily        Patient Counseling:   Nutrition: Stressed importance of a well balanced diet, moderation of sodium/saturated fat, caloric balance and sufficient intake of fiber  Exercise: Stressed the importance of regular exercise with a goal of 150 minutes per week  Dental Health: Discussed daily flossing and brushing and regular dental visits     Immunizations reviewed: Up To Date Flu vaccine up to date. Tdap will be due next year.   Due for pap. She is established with gynecology.     Follow up in 3 months.      Chief Complaint     Chief Complaint   Patient presents with    Well Check       History of Present Illness     Pacheco Willis is a 23 year old female presenting today for annual exam.     Mood is good. Occasional hydroxyzine.   Working night shift-- hard to find a good sleep schedule.   Trying to get a day shift position.   Tired all the time.     Flovent nasal spray intermittently using.    No recent albuterol use.     Excerise 150 minutes per week.     Has been to gyn in the past, Dr. Simeon Marsh.     Denies being sexually active currently.  Wants to regulate periods. Irregular periods, LMP .  Wants to restart OCP, considering becoming  sexually active.    Has a boyfriend.     Has headaches associated with hunger, skipping meals, but no migraines.   No personal or family history of blood clots.                               Well Adult Physical   Patient here for a comprehensive physical exam.      Diet and Physical Activity  Diet: well balanced diet  Exercise: infrequently 150 minutes of moderate intensity exercise recommended weekly.      Depression Screen  PHQ-2/9 Depression Screening    Little interest or pleasure in doing things: 1 - several days  Feeling down, depressed, or hopeless: 1 - several days  Trouble falling or staying asleep, or sleeping too much: 3 - nearly every day  Feeling tired or having little energy: 2 - more than half the days  Poor appetite or overeatin - more than half the days  Feeling bad about yourself - or that you are a failure or have let yourself or your family down: 1 - several days  Trouble concentrating on things, such as reading the newspaper or watching television: 1 - several days  Moving or speaking so slowly that other people could have noticed. Or the opposite - being so fidgety or restless that you have been moving around a lot more than usual: 0 - not at all  Thoughts that you would be better off dead, or of hurting yourself in some way: 0 - not at all          General Health  Hearing: Slighty decreased: bilateral ears feel clogged at times  Vision: no vision problems and most recent eye exam >1 year Due for eye exam.  Dental: regular dental visits    Reproductive Health  No issues       The following portions of the patient's history were reviewed and updated as appropriate: allergies, current medications, past family history, past medical history, past social history, past surgical history and problem list.    Review of Systems     Review of Systems   Constitutional: Negative.    HENT: Negative.     Respiratory: Negative.     Cardiovascular: Negative.    Gastrointestinal: Negative.    Genitourinary:  Negative.    Musculoskeletal: Negative.    Skin: Negative.    Neurological: Negative.    Hematological: Negative.    Psychiatric/Behavioral: Negative.         Past Medical History     Past Medical History:   Diagnosis Date    Heart murmur     In-toeing     Leg length discrepancy        Past Surgical History     Past Surgical History:   Procedure Laterality Date    TONSILLECTOMY         Social History     Social History     Socioeconomic History    Marital status: Single     Spouse name: None    Number of children: None    Years of education: None    Highest education level: None   Occupational History    None   Tobacco Use    Smoking status: Never    Smokeless tobacco: Never   Vaping Use    Vaping status: Never Used   Substance and Sexual Activity    Alcohol use: Yes    Drug use: No    Sexual activity: Never   Other Topics Concern    None   Social History Narrative    Lives at home with parents.  One brother.    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    Enjoys video games.  Has 2 dogs.     Social Determinants of Health     Financial Resource Strain: Not on file   Food Insecurity: Not on file   Transportation Needs: Not on file   Physical Activity: Not on file   Stress: Not on file   Social Connections: Not on file   Intimate Partner Violence: Not on file   Housing Stability: Not on file       Family History     Family History   Problem Relation Age of Onset    Mental illness Mother         anxiety, depression    Hypothyroidism Mother     Hypertension Father     Coronary artery disease Father     Other Father         Hypoalbuminemia    Mental illness Sister         anxiety, depression    Autism spectrum disorder Brother     Heart attack Paternal Grandfather     Diabetes Paternal Grandfather     Diabetes Family     Heart disease Family     Obesity Family        Current Medications       Current Outpatient Medications:     albuterol (PROVENTIL HFA,VENTOLIN HFA) 90 mcg/act inhaler, Inhale 2 puffs every 6 (six) hours  "as needed for wheezing or shortness of breath, Disp: 8 g, Rfl: 0    fluticasone (FLONASE) 50 mcg/act nasal spray, 2 sprays into each nostril daily, Disp: 16 g, Rfl: 0    hydrOXYzine HCL (ATARAX) 10 mg tablet, Take 1 tablet (10 mg total) by mouth 3 (three) times a day as needed for anxiety, Disp: 30 tablet, Rfl: 0    levonorgestrel-ethinyl estradiol (Jolessa) 0.15-0.03 MG per tablet, Take 1 tablet by mouth daily, Disp: 90 tablet, Rfl: 3     Allergies     Allergies   Allergen Reactions    Pollen Extract     Short Ragweed Pollen Ext        Objective     /80   Pulse (!) 115   Temp 97.8 °F (36.6 °C) (Temporal)   Resp 16   Ht 5' 5\" (1.651 m)   Wt 63 kg (139 lb)   LMP 12/20/2023 (Approximate)   SpO2 98%   BMI 23.13 kg/m²      Physical Exam  Vitals and nursing note reviewed.   Constitutional:       General: She is not in acute distress.     Appearance: Normal appearance. She is well-developed. She is not ill-appearing.   HENT:      Head: Normocephalic and atraumatic.      Right Ear: Tympanic membrane normal.      Left Ear: Tympanic membrane normal.      Mouth/Throat:      Mouth: Mucous membranes are moist.      Pharynx: Oropharynx is clear.   Eyes:      Conjunctiva/sclera: Conjunctivae normal.      Pupils: Pupils are equal, round, and reactive to light.   Neck:      Thyroid: No thyromegaly.   Cardiovascular:      Rate and Rhythm: Normal rate and regular rhythm.      Heart sounds: No murmur heard.  Pulmonary:      Effort: Pulmonary effort is normal.      Breath sounds: Normal breath sounds.   Abdominal:      General: Bowel sounds are normal.      Palpations: Abdomen is soft.      Tenderness: There is no abdominal tenderness.   Musculoskeletal:         General: No deformity.      Cervical back: Normal range of motion and neck supple.      Right lower leg: No edema.      Left lower leg: No edema.   Lymphadenopathy:      Cervical: No cervical adenopathy.   Skin:     Findings: No rash.   Neurological:      Mental " Status: She is alert and oriented to person, place, and time.   Psychiatric:         Mood and Affect: Mood normal.                 ARELIS Ambriz  [facility]

## 2024-02-06 ENCOUNTER — OFFICE VISIT (OUTPATIENT)
Dept: URGENT CARE | Facility: CLINIC | Age: 24
End: 2024-02-06
Payer: COMMERCIAL

## 2024-02-06 VITALS
RESPIRATION RATE: 18 BRPM | WEIGHT: 136.2 LBS | SYSTOLIC BLOOD PRESSURE: 108 MMHG | BODY MASS INDEX: 22.66 KG/M2 | OXYGEN SATURATION: 98 % | HEART RATE: 111 BPM | DIASTOLIC BLOOD PRESSURE: 62 MMHG | TEMPERATURE: 97.9 F

## 2024-02-06 DIAGNOSIS — B34.9 VIRAL INFECTION: Primary | ICD-10-CM

## 2024-02-06 LAB — S PYO AG THROAT QL: NEGATIVE

## 2024-02-06 PROCEDURE — 87880 STREP A ASSAY W/OPTIC: CPT

## 2024-02-06 PROCEDURE — 99213 OFFICE O/P EST LOW 20 MIN: CPT

## 2024-02-06 PROCEDURE — 87636 SARSCOV2 & INF A&B AMP PRB: CPT

## 2024-02-06 PROCEDURE — 87070 CULTURE OTHR SPECIMN AEROBIC: CPT

## 2024-02-06 NOTE — LETTER
February 6, 2024     Patient: Pacheco Willis   YOB: 2000   Date of Visit: 2/6/2024       To Whom it May Concern:    Pacheco Willis was seen in my clinic on 2/6/2024. She may return to work on 02/07/24 .    If you have any questions or concerns, please don't hesitate to call.         Sincerely,          ARELIS Pedraza        CC: No Recipients

## 2024-02-06 NOTE — PATIENT INSTRUCTIONS
Tylenol or Motrin for pain body aches fever  Use Mucinex as directed on the box  Flonase nasal spray 1 spray in each nostril daily  Saline nasal spray as often as needed in each nostril  Your rapid strep was negative in the office today I will send that out for culture that usually takes 24 hours I will call you if it is positive  Your swab for COVID and flu will send out that will take 2 days to come back I will call you if it is positive

## 2024-02-06 NOTE — PROGRESS NOTES
[facility]        NAME: Pacheco Willis is a 23 y.o. female  : 2000    MRN: [id number]  DATE: 2024  TIME: 12:41 PM    Assessment and Plan   Viral infection [B34.9]  1. Viral infection  POCT rapid strepA    Covid/Flu-Office Collect    Throat culture            Patient Instructions   Tylenol or Motrin for pain body aches fever  Use Mucinex as directed on the box  Flonase nasal spray 1 spray in each nostril daily  Saline nasal spray as often as needed in each nostril  Your rapid strep was negative in the office today I will send that out for culture that usually takes 24 hours I will call you if it is positive  Your swab for COVID and flu will send out that will take 2 days to come back I will call you if it is positive    Follow up with PCP in 3-5 days.  Proceed to  ER if symptoms worsen.    Chief Complaint     Chief Complaint   Patient presents with    Sore Throat     Patient reports symptoms started yesterday, patient states that she also has chills and feel worse today.          History of Present Illness       This is a 23-year-old female who presents with her father today.  She started yesterday with a stomachache nasal congestion postnasal drip and a slight sore throat.  Everyone at home had a viral infection and is getting better.  She denies any fever or fatigue but did have chills last night.  She took Benadryl last night before going to bed.    Sore Throat   Associated symptoms include congestion. Pertinent negatives include no abdominal pain, coughing, diarrhea, ear discharge, ear pain, headaches, shortness of breath or vomiting.       Review of Systems   Review of Systems   Constitutional:  Positive for chills. Negative for fatigue and fever.   HENT:  Positive for congestion, postnasal drip, sinus pressure and sore throat. Negative for ear discharge, ear pain and sinus pain.    Eyes:  Negative for pain and discharge.   Respiratory:  Negative for cough and shortness of  breath.    Cardiovascular:  Negative for chest pain and palpitations.   Gastrointestinal:  Negative for abdominal pain, diarrhea, nausea and vomiting.   Genitourinary:  Negative for difficulty urinating and dysuria.   Musculoskeletal:  Negative for arthralgias and myalgias.   Skin:  Negative for rash.   Neurological:  Negative for dizziness, syncope, light-headedness, numbness and headaches.   Psychiatric/Behavioral:  Negative for agitation.    All other systems reviewed and are negative.        Current Medications       Current Outpatient Medications:     albuterol (PROVENTIL HFA,VENTOLIN HFA) 90 mcg/act inhaler, Inhale 2 puffs every 6 (six) hours as needed for wheezing or shortness of breath, Disp: 8 g, Rfl: 0    fluticasone (FLONASE) 50 mcg/act nasal spray, 2 sprays into each nostril daily, Disp: 16 g, Rfl: 0    hydrOXYzine HCL (ATARAX) 10 mg tablet, Take 1 tablet (10 mg total) by mouth 3 (three) times a day as needed for anxiety, Disp: 30 tablet, Rfl: 0    levonorgestrel-ethinyl estradiol (Jolessa) 0.15-0.03 MG per tablet, Take 1 tablet by mouth daily, Disp: 90 tablet, Rfl: 3    Current Allergies     Allergies as of 02/06/2024 - Reviewed 02/06/2024   Allergen Reaction Noted    Pollen extract  09/05/2014    Short ragweed pollen ext  09/05/2014            The following portions of the patient's history were reviewed and updated as appropriate: allergies, current medications, past family history, past medical history, past social history, past surgical history and problem list.     Past Medical History:   Diagnosis Date    Heart murmur     In-toeing     Leg length discrepancy        Past Surgical History:   Procedure Laterality Date    TONSILLECTOMY         Family History   Problem Relation Age of Onset    Mental illness Mother         anxiety, depression    Hypothyroidism Mother     Hypertension Father     Coronary artery disease Father     Other Father         Hypoalbuminemia    Mental illness Sister          anxiety, depression    Autism spectrum disorder Brother     Heart attack Paternal Grandfather     Diabetes Paternal Grandfather     Diabetes Family     Heart disease Family     Obesity Family          Medications have been verified.        Objective   /62   Pulse (!) 111   Temp 97.9 °F (36.6 °C) (Tympanic)   Resp 18   Wt 61.8 kg (136 lb 3.2 oz)   SpO2 98%   BMI 22.66 kg/m²   No LMP recorded.       Physical Exam     Physical Exam  Constitutional:       Appearance: She is well-developed and normal weight.   HENT:      Head: Normocephalic.      Right Ear: Tympanic membrane and ear canal normal. No tenderness. No middle ear effusion. Tympanic membrane is not erythematous.      Left Ear: Tympanic membrane and ear canal normal. No tenderness.  No middle ear effusion. Tympanic membrane is not erythematous.      Nose: Congestion present. No rhinorrhea.      Mouth/Throat:      Mouth: Mucous membranes are moist. No oral lesions.      Pharynx: Posterior oropharyngeal erythema present. No pharyngeal swelling, oropharyngeal exudate or uvula swelling.      Tonsils: No tonsillar exudate or tonsillar abscesses. 0 on the right. 0 on the left.   Eyes:      Conjunctiva/sclera: Conjunctivae normal.      Pupils: Pupils are equal, round, and reactive to light.   Cardiovascular:      Rate and Rhythm: Normal rate and regular rhythm.      Heart sounds: Normal heart sounds.   Pulmonary:      Effort: Pulmonary effort is normal.      Breath sounds: Normal breath sounds.   Abdominal:      General: Bowel sounds are normal.      Palpations: Abdomen is soft.      Tenderness: There is no abdominal tenderness.   Musculoskeletal:      Cervical back: Normal range of motion and neck supple.   Skin:     General: Skin is warm and dry.      Capillary Refill: Capillary refill takes less than 2 seconds.   Neurological:      General: No focal deficit present.      Mental Status: She is alert and oriented to person, place, and time.   Psychiatric:          Mood and Affect: Mood normal.

## 2024-02-07 LAB
FLUAV RNA RESP QL NAA+PROBE: NEGATIVE
FLUBV RNA RESP QL NAA+PROBE: NEGATIVE
SARS-COV-2 RNA RESP QL NAA+PROBE: NEGATIVE

## 2024-02-08 ENCOUNTER — OFFICE VISIT (OUTPATIENT)
Dept: FAMILY MEDICINE CLINIC | Facility: CLINIC | Age: 24
End: 2024-02-08
Payer: COMMERCIAL

## 2024-02-08 VITALS
OXYGEN SATURATION: 98 % | BODY MASS INDEX: 22.99 KG/M2 | SYSTOLIC BLOOD PRESSURE: 110 MMHG | WEIGHT: 138 LBS | HEIGHT: 65 IN | TEMPERATURE: 100.9 F | HEART RATE: 114 BPM | DIASTOLIC BLOOD PRESSURE: 80 MMHG | RESPIRATION RATE: 16 BRPM

## 2024-02-08 DIAGNOSIS — J32.9 SINUSITIS, UNSPECIFIED CHRONICITY, UNSPECIFIED LOCATION: Primary | ICD-10-CM

## 2024-02-08 LAB — BACTERIA THROAT CULT: NORMAL

## 2024-02-08 PROCEDURE — 99213 OFFICE O/P EST LOW 20 MIN: CPT | Performed by: NURSE PRACTITIONER

## 2024-02-08 RX ORDER — AMOXICILLIN 875 MG/1
875 TABLET, COATED ORAL 2 TIMES DAILY
Qty: 14 TABLET | Refills: 0 | Status: SHIPPED | OUTPATIENT
Start: 2024-02-08 | End: 2024-02-15

## 2024-02-08 NOTE — LETTER
February 8, 2024     Patient: Pacheco Willis  YOB: 2000  Date of Visit: 2/8/2024      To Whom it May Concern:    Pacheco Willis is under my professional care. Pacheco was seen in my office on 2/8/2024. Pacheco may return to work on 2/12/2024 .    If you have any questions or concerns, please don't hesitate to call.         Sincerely,          ARELIS Ambriz        CC: No Recipients

## 2024-02-08 NOTE — PROGRESS NOTES
"   FAMILY PRACTICE OFFICE VISIT       NAME: aPcheco Willis  AGE: 23 y.o. SEX: female       : 2000        MRN: [id number]    Assessment and Plan   1. Sinusitis, unspecified chronicity, unspecified location  -     amoxicillin (AMOXIL) 875 mg tablet; Take 1 tablet (875 mg total) by mouth 2 (two) times a day for 7 days     Start Amoxicillin.  If symptoms should worsen or persist she will call me.    Chief Complaint     Chief Complaint   Patient presents with    Cold Like Symptoms     Sinus congestion, headaches, nausea, cough and sore throat 3 + days. Strep, flu and covid test negative       History of Present Illness     Pacheco Willis is a 23 year old female presenting today for URI symptoms.     Started Monday.   Worse Tuesday.     Mom and dad sick with similar symptoms.   Family Negative Flu and COVID-19.  She tested negative for COVID-19 at urgent care.     Symptoms include:  Fevers, chills, sweats, body aches.   Sinus pressure, tightening  Headaches  Sore throat, initially, but gone now.   Nasal congestion. Cough.  No nausea, vomiting or diarrhea.     Taking Mucinex, Tylenol, Flonase.     Accompanied by her dad today.         Review of Systems   Review of Systems   Constitutional:  Positive for chills, diaphoresis, fatigue and fever.   HENT:  Positive for congestion, postnasal drip and rhinorrhea. Negative for ear pain and sore throat.    Respiratory:  Positive for cough. Negative for chest tightness, shortness of breath and wheezing.    Cardiovascular: Negative.    Gastrointestinal: Negative.    Neurological:  Positive for headaches.       I have reviewed the patient's medical history in detail; there are no changes to the history as noted in the electronic medical record.    Objective     Vitals:    24 0749   BP: 110/80   Pulse: (!) 114   Resp: 16   Temp: (!) 100.9 °F (38.3 °C)   TempSrc: Tympanic   SpO2: 98%   Weight: 62.6 kg (138 lb)   Height: 5' 5\" (1.651 m)     Wt Readings from " Last 3 Encounters:   02/08/24 62.6 kg (138 lb)   02/06/24 61.8 kg (136 lb 3.2 oz)   12/28/23 63 kg (139 lb)     Physical Exam  Vitals and nursing note reviewed.   Constitutional:       Appearance: She is ill-appearing.   HENT:      Right Ear: Tympanic membrane normal.      Left Ear: Tympanic membrane normal.      Nose: Congestion and rhinorrhea present.      Mouth/Throat:      Mouth: Mucous membranes are moist.      Pharynx: Oropharynx is clear.   Cardiovascular:      Rate and Rhythm: Regular rhythm. Tachycardia present.      Heart sounds: No murmur heard.  Pulmonary:      Effort: Pulmonary effort is normal.      Breath sounds: Normal breath sounds.   Musculoskeletal:      Cervical back: Normal range of motion and neck supple.   Lymphadenopathy:      Cervical: No cervical adenopathy.   Neurological:      Mental Status: She is alert.   Psychiatric:         Mood and Affect: Mood normal.            ALLERGIES:  Allergies   Allergen Reactions    Pollen Extract     Short Ragweed Pollen Ext        Current Medications     Current Outpatient Medications   Medication Sig Dispense Refill    amoxicillin (AMOXIL) 875 mg tablet Take 1 tablet (875 mg total) by mouth 2 (two) times a day for 7 days 14 tablet 0    fluticasone (FLONASE) 50 mcg/act nasal spray 2 sprays into each nostril daily 16 g 0    hydrOXYzine HCL (ATARAX) 10 mg tablet Take 1 tablet (10 mg total) by mouth 3 (three) times a day as needed for anxiety 30 tablet 0    levonorgestrel-ethinyl estradiol (Jolessa) 0.15-0.03 MG per tablet Take 1 tablet by mouth daily 90 tablet 3    albuterol (PROVENTIL HFA,VENTOLIN HFA) 90 mcg/act inhaler Inhale 2 puffs every 6 (six) hours as needed for wheezing or shortness of breath 8 g 0     No current facility-administered medications for this visit.         Health Maintenance     Health Maintenance   Topic Date Due    Hepatitis C Screening  Never done    Pneumococcal Vaccine: Pediatrics (0 to 5 Years) and At-Risk Patients (6 to 64  Years) (1 - PCV) 11/26/2008    Depression Follow-up Plan  Never done    HIV Screening  Never done    Chlamydia Screening  Never done    COVID-19 Vaccine (3 - 2023-24 season) 09/01/2023    Cervical Cancer Screening  Never done    DTaP,Tdap,and Td Vaccines (7 - Td or Tdap) 09/05/2024    Depression Screening  09/25/2024    Annual Physical  12/28/2024    Zoster Vaccine (1 of 2) 11/26/2052    HIB Vaccine  Completed    IPV Vaccine  Completed    Hepatitis A Vaccine  Completed    Meningococcal ACWY Vaccine  Completed    Influenza Vaccine  Completed    HPV Vaccine  Completed     Immunization History   Administered Date(s) Administered    COVID-19 PFIZER VACCINE 0.3 ML IM 06/19/2021, 07/10/2021    DTaP 02/05/2003, 03/26/2003, 06/07/2003, 03/01/2004, 01/22/2007    DTaP 5 02/05/2003, 03/26/2003, 06/07/2003, 03/01/2004, 01/22/2007    HPV Quadrivalent 09/05/2014, 11/18/2014, 03/23/2015    Hep A, adult 09/12/2013, 09/05/2014    Hep B, Adolescent or Pediatric 2002, 01/06/2003, 06/07/2003    Hep B, adult 2002, 01/06/2003, 06/07/2003    Hepatitis A 09/12/2013    HiB 02/05/2003, 03/26/2003, 06/07/2003, 03/01/2004    Hib (PRP-OMP) 02/05/2003, 03/26/2003, 06/07/2003, 03/01/2004    INFLUENZA 11/13/2008, 11/17/2010, 09/22/2011, 10/23/2017, 09/23/2019, 09/25/2020, 09/23/2021, 09/28/2023    IPV 02/05/2003, 03/26/2003, 06/07/2003, 01/22/2007    Influenza Quadrivalent Preservative Free 3 years and older IM 09/21/2016, 10/23/2017    Influenza, injectable, quadrivalent, preservative free 0.5 mL 10/09/2018, 09/23/2019, 09/25/2020, 09/23/2021    Influenza, seasonal, injectable 11/13/2008, 11/17/2010, 09/22/2011, 11/18/2014, 10/27/2015    MMR 12/16/2003, 01/22/2007    Meningococcal MCV4, Unspecified 04/19/2019    Meningococcal MCV4P 04/19/2019    Meningococcal, Unknown Serogroups 09/05/2014    Pneumococcal Conjugate PCV 7 02/05/2003, 03/26/2003, 06/07/2003, 12/16/2003    Tdap 09/05/2014    Varicella 12/16/2003, 02/15/2008       Ying MA  ARELIS Boyd

## 2024-02-12 ENCOUNTER — TELEPHONE (OUTPATIENT)
Dept: FAMILY MEDICINE CLINIC | Facility: CLINIC | Age: 24
End: 2024-02-12

## 2024-02-12 NOTE — TELEPHONE ENCOUNTER
Spoke to patient, extended note to return to work tomorrow, patient aware and will  copy in the office

## 2024-02-12 NOTE — TELEPHONE ENCOUNTER
Patient stopped into the office to advise she is still not feeling well and is requesting and extended excuse note. Okay to provide?

## 2024-03-22 ENCOUNTER — RA CDI HCC (OUTPATIENT)
Dept: OTHER | Facility: HOSPITAL | Age: 24
End: 2024-03-22

## 2024-03-22 NOTE — PROGRESS NOTES
Exercise-induced asthma  Noted 9/23/2019  [J45.990]    Mild episode of recurrent major depressive disorder (HCC)  Noted 9/23/2021  [F33.0]    NOT on the BPA- please assess using MEAT for 2024 billing    HCC coding opportunities          Chart Reviewed number of suggestions sent to Provider: 2     Patients Insurance        Commercial Insurance: [employer]

## 2024-03-29 ENCOUNTER — OFFICE VISIT (OUTPATIENT)
Dept: FAMILY MEDICINE CLINIC | Facility: CLINIC | Age: 24
End: 2024-03-29
Payer: COMMERCIAL

## 2024-03-29 VITALS
BODY MASS INDEX: 22.89 KG/M2 | RESPIRATION RATE: 16 BRPM | OXYGEN SATURATION: 100 % | SYSTOLIC BLOOD PRESSURE: 112 MMHG | TEMPERATURE: 98.2 F | HEIGHT: 65 IN | HEART RATE: 82 BPM | DIASTOLIC BLOOD PRESSURE: 76 MMHG | WEIGHT: 137.4 LBS

## 2024-03-29 DIAGNOSIS — Z30.09 GENERAL COUNSELING AND ADVICE ON FEMALE CONTRACEPTION: Primary | ICD-10-CM

## 2024-03-29 PROCEDURE — 99213 OFFICE O/P EST LOW 20 MIN: CPT | Performed by: NURSE PRACTITIONER

## 2024-03-29 NOTE — PROGRESS NOTES
"   FAMILY PRACTICE OFFICE VISIT       NAME: Pacheco Willis  AGE: 23 y.o. SEX: female       : 2000        MRN: [id number]    Assessment and Plan   1. General counseling and advice on female contraception  -     Ambulatory Referral to Obstetrics / Gynecology; Future     Pacheco has been taking OCP for 3 months now, Jolessa.   She was having mild moodiness prior to OCP, but felt it was manageable.   Since OCP, moods are more labile and intense. Sad, angry, numb.   Will stop OCP. Will have her follow up with gyn to review contraception options that may have less effects on mood.   She previously saw Dr. Calles, and will return to see her again.       Chief Complaint     Chief Complaint   Patient presents with    Follow-up     3m f/u       History of Present Illness     Pacheco Willis is a 23 year old female presenting today for follow up on OCP.     Started OCP in December.   Mood is labile.   She feels intermittently sad, angry, numb.     Was having this before starting pills, but less intense and was managing moods.  Previously took sertraline, but stopped this more than a year ago now.     Accompanied by mom today.            Review of Systems   Review of Systems   Constitutional: Negative.    Psychiatric/Behavioral:  Positive for agitation (irritable) and dysphoric mood. Negative for self-injury, sleep disturbance and suicidal ideas.         Mood as noted in HPI       I have reviewed the patient's medical history in detail; there are no changes to the history as noted in the electronic medical record.    Objective     Vitals:    24 0843   BP: 112/76   BP Location: Left arm   Patient Position: Sitting   Cuff Size: Standard   Pulse: 82   Resp: 16   Temp: 98.2 °F (36.8 °C)   TempSrc: Temporal   SpO2: 100%   Weight: 62.3 kg (137 lb 6.4 oz)   Height: 5' 5\" (1.651 m)     Wt Readings from Last 3 Encounters:   24 62.3 kg (137 lb 6.4 oz)   24 62.6 kg (138 lb)   24 61.8 kg " (136 lb 3.2 oz)     Physical Exam  Vitals and nursing note reviewed.   Constitutional:       General: She is not in acute distress.     Appearance: Normal appearance. She is not ill-appearing.   Cardiovascular:      Rate and Rhythm: Normal rate.   Pulmonary:      Effort: Pulmonary effort is normal. No respiratory distress.   Neurological:      Mental Status: She is alert.   Psychiatric:         Mood and Affect: Mood normal.         Depression Screening and Follow-up Plan: Patient's depression screening was positive with a PHQ-9 score of 11. Patient declines further evaluation by mental health professional and/or medications. Brief counseling provided. Will re-evaluate at next office visit.         ALLERGIES:  Allergies   Allergen Reactions    Pollen Extract     Short Ragweed Pollen Ext        Current Medications     Current Outpatient Medications   Medication Sig Dispense Refill    albuterol (PROVENTIL HFA,VENTOLIN HFA) 90 mcg/act inhaler Inhale 2 puffs every 6 (six) hours as needed for wheezing or shortness of breath 8 g 0    fluticasone (FLONASE) 50 mcg/act nasal spray 2 sprays into each nostril daily 16 g 0    hydrOXYzine HCL (ATARAX) 10 mg tablet Take 1 tablet (10 mg total) by mouth 3 (three) times a day as needed for anxiety 30 tablet 0    levonorgestrel-ethinyl estradiol (Jolessa) 0.15-0.03 MG per tablet Take 1 tablet by mouth daily 90 tablet 3     No current facility-administered medications for this visit.         Health Maintenance     Health Maintenance   Topic Date Due    Hepatitis C Screening  Never done    Pneumococcal Vaccine: Pediatrics (0 to 5 Years) and At-Risk Patients (6 to 64 Years) (1 of 2 - PCV) 11/26/2008    Depression Follow-up Plan  Never done    HIV Screening  Never done    Chlamydia Screening  Never done    COVID-19 Vaccine (3 - 2023-24 season) 09/01/2023    Cervical Cancer Screening  Never done    DTaP,Tdap,and Td Vaccines (7 - Td or Tdap) 09/05/2024    Annual Physical  12/28/2024     Depression Screening  02/08/2025    Zoster Vaccine (1 of 2) 11/26/2052    HIB Vaccine  Completed    IPV Vaccine  Completed    Hepatitis A Vaccine  Completed    Meningococcal ACWY Vaccine  Completed    Influenza Vaccine  Completed    HPV Vaccine  Completed     Immunization History   Administered Date(s) Administered    COVID-19 PFIZER VACCINE 0.3 ML IM 06/19/2021, 07/10/2021    DTaP 02/05/2003, 03/26/2003, 06/07/2003, 03/01/2004, 01/22/2007    DTaP 5 02/05/2003, 03/26/2003, 06/07/2003, 03/01/2004, 01/22/2007    HPV Quadrivalent 09/05/2014, 11/18/2014, 03/23/2015    Hep A, adult 09/12/2013, 09/05/2014    Hep B, Adolescent or Pediatric 2002, 01/06/2003, 06/07/2003    Hep B, adult 2002, 01/06/2003, 06/07/2003    Hepatitis A 09/12/2013    HiB 02/05/2003, 03/26/2003, 06/07/2003, 03/01/2004    Hib (PRP-OMP) 02/05/2003, 03/26/2003, 06/07/2003, 03/01/2004    INFLUENZA 11/13/2008, 11/17/2010, 09/22/2011, 10/23/2017, 09/23/2019, 09/25/2020, 09/23/2021, 09/28/2023    IPV 02/05/2003, 03/26/2003, 06/07/2003, 01/22/2007    Influenza Quadrivalent Preservative Free 3 years and older IM 09/21/2016, 10/23/2017    Influenza, injectable, quadrivalent, preservative free 0.5 mL 10/09/2018, 09/23/2019, 09/25/2020, 09/23/2021    Influenza, seasonal, injectable 11/13/2008, 11/17/2010, 09/22/2011, 11/18/2014, 10/27/2015    MMR 12/16/2003, 01/22/2007    Meningococcal MCV4, Unspecified 04/19/2019    Meningococcal MCV4P 04/19/2019    Meningococcal, Unknown Serogroups 09/05/2014    Pneumococcal Conjugate PCV 7 02/05/2003, 03/26/2003, 06/07/2003, 12/16/2003    Tdap 09/05/2014    Varicella 12/16/2003, 02/15/2008       ARELIS Ambriz

## 2024-05-15 ENCOUNTER — OFFICE VISIT (OUTPATIENT)
Age: 24
End: 2024-05-15
Payer: COMMERCIAL

## 2024-05-15 VITALS
HEIGHT: 64 IN | WEIGHT: 131.4 LBS | DIASTOLIC BLOOD PRESSURE: 64 MMHG | BODY MASS INDEX: 22.43 KG/M2 | SYSTOLIC BLOOD PRESSURE: 104 MMHG

## 2024-05-15 DIAGNOSIS — Z30.011 ENCOUNTER FOR INITIAL PRESCRIPTION OF CONTRACEPTIVE PILLS: ICD-10-CM

## 2024-05-15 PROCEDURE — 99213 OFFICE O/P EST LOW 20 MIN: CPT | Performed by: OBSTETRICS & GYNECOLOGY

## 2024-05-15 RX ORDER — NORETHINDRONE ACETATE AND ETHINYL ESTRADIOL 1MG-20(21)
1 KIT ORAL DAILY
Qty: 84 TABLET | Refills: 3 | Status: SHIPPED | OUTPATIENT
Start: 2024-05-15

## 2024-05-15 NOTE — PROGRESS NOTES
"Assessment:     23 y.o., starting OCP (estrogen/progesterone), no contraindications.     Plan:     Will try Loestrin 1/20 Use and precaution reviewed  RTO for follow up and annual exam       Subjective      Pacheco Willis is a 23 y.o. female who presents for contraception counseling. The patient has no complaints today. The patient is sexually active.  She was recently on Jolessa but experienced severe mood swings on it.  Wants to restart a pill for contraception.  Notes that her periods are irregular if she is off the pill.    Menstrual History:  Nulligravida  Patient's last menstrual period was 05/11/2024 (exact date).  Period Cycle (Days):  (28-60)  Period Duration (Days): 4-5  Period Pattern: (!) Irregular  Menstrual Flow: Heavy, Moderate  Dysmenorrhea: (!) Moderate  Dysmenorrhea Symptoms: Cramping, Diarrhea, Other (Comment) (Back pain)    Past Medical History:   Diagnosis Date    Asthma N/A    Heart murmur     In-toeing     Leg length discrepancy        Family History   Problem Relation Age of Onset    Mental illness Mother         anxiety, depression    Hypothyroidism Mother     Hypertension Father     Coronary artery disease Father     Other Father         Hypoalbuminemia    Mental illness Sister         anxiety, depression    Autism spectrum disorder Brother     Heart attack Paternal Grandfather     Diabetes Paternal Grandfather     Diabetes Family     Heart disease Family     Obesity Family          The following portions of the patient's history were reviewed and updated as appropriate: allergies, current medications, past family history, past medical history, past social history, past surgical history, and problem list.    Review of Systems  Pertinent items are noted in HPI.     Objective      /64 (BP Location: Right arm, Patient Position: Sitting, Cuff Size: Standard)   Ht 5' 3.75\" (1.619 m)   Wt 59.6 kg (131 lb 6.4 oz)   LMP 05/11/2024 (Exact Date)   BMI 22.73 kg/m²     General:   alert " and oriented, in no acute distress   Heart: regular rate and rhythm   Lungs: Effort normal

## 2024-06-05 ENCOUNTER — HOSPITAL ENCOUNTER (EMERGENCY)
Facility: HOSPITAL | Age: 24
Discharge: HOME | End: 2024-06-05
Attending: EMERGENCY MEDICINE
Payer: COMMERCIAL

## 2024-06-05 VITALS
OXYGEN SATURATION: 100 % | RESPIRATION RATE: 20 BRPM | SYSTOLIC BLOOD PRESSURE: 96 MMHG | HEART RATE: 82 BPM | DIASTOLIC BLOOD PRESSURE: 58 MMHG | TEMPERATURE: 98.3 F

## 2024-06-05 DIAGNOSIS — R51.9 HEADACHE: Primary | ICD-10-CM

## 2024-06-05 LAB
EXT PREGNANCY TEST URINE: NEGATIVE
EXT. CONTROL: NORMAL

## 2024-06-05 PROCEDURE — 99284 EMERGENCY DEPT VISIT MOD MDM: CPT | Performed by: EMERGENCY MEDICINE

## 2024-06-05 PROCEDURE — 96375 TX/PRO/DX INJ NEW DRUG ADDON: CPT

## 2024-06-05 PROCEDURE — 81025 URINE PREGNANCY TEST: CPT

## 2024-06-05 PROCEDURE — 96361 HYDRATE IV INFUSION ADD-ON: CPT

## 2024-06-05 PROCEDURE — 96365 THER/PROPH/DIAG IV INF INIT: CPT

## 2024-06-05 PROCEDURE — 99284 EMERGENCY DEPT VISIT MOD MDM: CPT

## 2024-06-05 RX ORDER — DIPHENHYDRAMINE HYDROCHLORIDE 50 MG/ML
12.5 INJECTION INTRAMUSCULAR; INTRAVENOUS ONCE
Status: COMPLETED | OUTPATIENT
Start: 2024-06-05 | End: 2024-06-05

## 2024-06-05 RX ORDER — MAGNESIUM SULFATE HEPTAHYDRATE 40 MG/ML
2 INJECTION, SOLUTION INTRAVENOUS ONCE
Status: COMPLETED | OUTPATIENT
Start: 2024-06-05 | End: 2024-06-05

## 2024-06-05 RX ORDER — KETOROLAC TROMETHAMINE 30 MG/ML
15 INJECTION, SOLUTION INTRAMUSCULAR; INTRAVENOUS ONCE
Status: COMPLETED | OUTPATIENT
Start: 2024-06-05 | End: 2024-06-05

## 2024-06-05 RX ORDER — METOCLOPRAMIDE HYDROCHLORIDE 5 MG/ML
10 INJECTION INTRAMUSCULAR; INTRAVENOUS ONCE
Status: COMPLETED | OUTPATIENT
Start: 2024-06-05 | End: 2024-06-05

## 2024-06-05 RX ADMIN — DIPHENHYDRAMINE HYDROCHLORIDE 12.5 MG: 50 INJECTION, SOLUTION INTRAMUSCULAR; INTRAVENOUS at 19:35

## 2024-06-05 RX ADMIN — SODIUM CHLORIDE 1000 ML: 0.9 INJECTION, SOLUTION INTRAVENOUS at 19:49

## 2024-06-05 RX ADMIN — MAGNESIUM SULFATE HEPTAHYDRATE 2 G: 40 INJECTION, SOLUTION INTRAVENOUS at 19:47

## 2024-06-05 RX ADMIN — KETOROLAC TROMETHAMINE 15 MG: 30 INJECTION, SOLUTION INTRAMUSCULAR; INTRAVENOUS at 19:32

## 2024-06-05 RX ADMIN — METOCLOPRAMIDE 10 MG: 5 INJECTION, SOLUTION INTRAMUSCULAR; INTRAVENOUS at 19:40

## 2024-06-05 NOTE — Clinical Note
Pacheco Willis was seen and treated in our emergency department on 6/5/2024.                Diagnosis:     Pacheco  may return to work on return date.    She may return on this date: 06/06/2024         If you have any questions or concerns, please don't hesitate to call.      Rosmery Power MD    ______________________________           _______________          _______________  Hospital Representative                              Date                                Time

## 2024-06-06 NOTE — ED ATTENDING ATTESTATION
6/5/2024  I, Cotres Castillo MD, saw and evaluated the patient. I have discussed the patient with the resident/non-physician practitioner and agree with the resident's/non-physician practitioner's findings, Plan of Care, and MDM as documented in the resident's/non-physician practitioner's note, except where noted. All available labs and Radiology studies were reviewed.  I was present for key portions of any procedure(s) performed by the resident/non-physician practitioner and I was immediately available to provide assistance.       At this point I agree with the current assessment done in the Emergency Department.  I have conducted an independent evaluation of this patient a history and physical is as follows:    23-year-old female presented for evaluation of waxing and waning headache for the last 2 days or so.  Has been using Tylenol without much relief.  Notes some associated nausea without vomiting as well as photophobia.  No focal neurologic deficits.  Plan migraine cocktail, reevaluate.    ED Course  ED Course as of 06/06/24 0046   Wed Jun 05, 2024 2201 Patient reports symptomatic improvement after migraine cocktail here.  Stable for discharge home.         Critical Care Time  Procedures

## 2024-06-10 NOTE — ED PROVIDER NOTES
History  Chief Complaint   Patient presents with    Headache     Patient states she has had a bad headache for last couple days. Has hx of headaches. Patient has been taking tylenol at home with minimal relief. + Photosensitivity and nausea.      23-year-old female with history of asthma presents today for evaluation of headaches.  She does have a remote history of prior headaches but has not had a headache that required medical intervention in a long time.  She did recently start a new OCP and believes the migraine may be related to the start of her menstrual cycle.  Reports diffuse headache over the past few days that has been occurring intermittently.  Reports associated photosensitivity and nausea.  No vomiting.  Has tried Tylenol at home with some relief, no analgesia at home today.  No fevers, no neck pain, no other symptoms.      Headache  Associated symptoms: nausea and photophobia    Associated symptoms: no abdominal pain, no back pain, no cough, no ear pain, no eye pain, no fever, no seizures, no sore throat and no vomiting        Prior to Admission Medications   Prescriptions Last Dose Informant Patient Reported? Taking?   albuterol (PROVENTIL HFA,VENTOLIN HFA) 90 mcg/act inhaler  Self No No   Sig: Inhale 2 puffs every 6 (six) hours as needed for wheezing or shortness of breath   fluticasone (FLONASE) 50 mcg/act nasal spray  Self No No   Si sprays into each nostril daily   hydrOXYzine HCL (ATARAX) 10 mg tablet  Self No No   Sig: Take 1 tablet (10 mg total) by mouth 3 (three) times a day as needed for anxiety   norethindrone-ethinyl estradiol (Loestrin Fe ) 1-20 MG-MCG per tablet   No No   Sig: Take 1 tablet by mouth daily      Facility-Administered Medications: None       Past Medical History:   Diagnosis Date    Asthma N/A    Heart murmur     In-toeing     Leg length discrepancy        Past Surgical History:   Procedure Laterality Date    TONSILLECTOMY         Family History   Problem Relation  Age of Onset    Mental illness Mother         anxiety, depression    Hypothyroidism Mother     Hypertension Father     Coronary artery disease Father     Other Father         Hypoalbuminemia    Mental illness Sister         anxiety, depression    Autism spectrum disorder Brother     Heart attack Paternal Grandfather     Diabetes Paternal Grandfather     Diabetes Family     Heart disease Family     Obesity Family      I have reviewed and agree with the history as documented.    E-Cigarette/Vaping    E-Cigarette Use Never User      E-Cigarette/Vaping Substances    Nicotine No     THC No     CBD No     Flavoring No     Other No     Unknown No      Social History     Tobacco Use    Smoking status: Never    Smokeless tobacco: Never   Vaping Use    Vaping status: Never Used   Substance Use Topics    Alcohol use: Yes     Comment: socail    Drug use: No        Review of Systems   Constitutional:  Negative for chills and fever.   HENT:  Negative for ear pain and sore throat.    Eyes:  Positive for photophobia. Negative for pain and visual disturbance.   Respiratory:  Negative for cough and shortness of breath.    Cardiovascular:  Negative for chest pain and palpitations.   Gastrointestinal:  Positive for nausea. Negative for abdominal pain and vomiting.   Genitourinary:  Negative for dysuria and hematuria.   Musculoskeletal:  Negative for arthralgias and back pain.   Skin:  Negative for color change and rash.   Neurological:  Positive for headaches. Negative for seizures and syncope.   All other systems reviewed and are negative.      Physical Exam  ED Triage Vitals   Temperature Pulse Respirations Blood Pressure SpO2   06/05/24 1842 06/05/24 1842 06/05/24 1842 06/05/24 1842 06/05/24 1842   98.3 °F (36.8 °C) 101 20 127/77 100 %      Temp Source Heart Rate Source Patient Position - Orthostatic VS BP Location FiO2 (%)   06/05/24 1842 06/05/24 1842 06/05/24 1842 06/05/24 1842 --   Oral Monitor Sitting Right arm       Pain Score        06/05/24 1930       4             Orthostatic Vital Signs  Vitals:    06/05/24 1842 06/05/24 2015 06/05/24 2030   BP: 127/77 98/58 96/58   Pulse: 101 76 82   Patient Position - Orthostatic VS: Sitting Lying Lying       Physical Exam  Vitals and nursing note reviewed.   Constitutional:       General: She is not in acute distress.     Appearance: Normal appearance. She is well-developed.      Comments: Resting comfortably in exam bed   HENT:      Head: Normocephalic and atraumatic.   Eyes:      Extraocular Movements: Extraocular movements intact.      Conjunctiva/sclera: Conjunctivae normal.      Pupils: Pupils are equal, round, and reactive to light.      Comments: No nystagmus   Neck:      Comments: No neck pain, no cervical tenderness, no meningeal signs  Cardiovascular:      Rate and Rhythm: Normal rate and regular rhythm.      Heart sounds: No murmur heard.  Pulmonary:      Effort: Pulmonary effort is normal. No respiratory distress.      Breath sounds: Normal breath sounds.   Abdominal:      Palpations: Abdomen is soft.      Tenderness: There is no abdominal tenderness.   Musculoskeletal:         General: No swelling.      Cervical back: Neck supple.   Skin:     General: Skin is warm and dry.      Capillary Refill: Capillary refill takes less than 2 seconds.   Neurological:      General: No focal deficit present.      Mental Status: She is alert.      GCS: GCS eye subscore is 4. GCS verbal subscore is 5. GCS motor subscore is 6.      Cranial Nerves: Cranial nerves 2-12 are intact.      Sensory: Sensation is intact.      Motor: Motor function is intact.      Coordination: Coordination is intact.      Gait: Gait is intact.      Comments: Nonfocal neurological exam         ED Medications  Medications   ketorolac (TORADOL) injection 15 mg (15 mg Intravenous Given 6/5/24 1932)   metoclopramide (REGLAN) injection 10 mg (10 mg Intravenous Given 6/5/24 1940)   diphenhydrAMINE (BENADRYL) injection 12.5 mg (12.5 mg  Intravenous Given 6/5/24 1935)   magnesium sulfate 2 g/50 mL IVPB (premix) 2 g (0 g Intravenous Stopped 6/5/24 2041)   sodium chloride 0.9 % bolus 1,000 mL (0 mL Intravenous Stopped 6/5/24 2120)       Diagnostic Studies  Results Reviewed       Procedure Component Value Units Date/Time    POCT pregnancy, urine [[id number]]  (Normal) Resulted: 06/05/24 1930    Lab Status: Final result Specimen: Urine Updated: 06/05/24 2042     EXT Preg Test, Ur Negative     Control Valid                   No orders to display         Procedures  Procedures      ED Course                                       Medical Decision Making  23-year-old female with history of asthma presents today for evaluation of headache occurring intermittently over the past several days.  Remote history of migraines.  Recently started new OCP.  DDx including but not limited to: tension headache, migraine.  No signs of meningitis or infectious symptoms.  Doubt intracranial abnormality, SAH or acute intracranial hemorrhage.  No family history of ruptured aneurysms.  Patient had complete resolution of her headache following migraine cocktail.  Recommended PCP follow-up and reviewed close return precautions.     Amount and/or Complexity of Data Reviewed  Labs: ordered.    Risk  Prescription drug management.          Disposition  Final diagnoses:   Headache     Time reflects when diagnosis was documented in both MDM as applicable and the Disposition within this note       Time User Action Codes Description Comment    6/5/2024  9:59 PM Rosmery Power Add [R51.9] Headache           ED Disposition       ED Disposition   Discharge    Condition   Stable    Date/Time   Wed Jun 5, 2024  9:59 PM    Comment   Pacheco Willis discharge to home/self care.                   Follow-up Information       Follow up With Specialties Details Why Contact Info Additional Information    [facility], Nurse Practitioner Schedule an appointment as soon as  possible for a visit   [address] [facility] [place] [address]  [phone]       [facility] Emergency Department Emergency Medicine  As needed, If symptoms worsen [address] [facility] [address]  [phone] [facility] Emergency Department, [address] [place], [place], [address]            Discharge Medication List as of 6/5/2024 10:00 PM        CONTINUE these medications which have NOT CHANGED    Details   albuterol (PROVENTIL HFA,VENTOLIN HFA) 90 mcg/act inhaler Inhale 2 puffs every 6 (six) hours as needed for wheezing or shortness of breath, Starting Tue 12/27/2022, Normal      fluticasone (FLONASE) 50 mcg/act nasal spray 2 sprays into each nostril daily, Starting Tue 12/27/2022, Normal      hydrOXYzine HCL (ATARAX) 10 mg tablet Take 1 tablet (10 mg total) by mouth 3 (three) times a day as needed for anxiety, Starting Fri 11/12/2021, Normal      norethindrone-ethinyl estradiol (Loestrin Fe 1/20) 1-20 MG-MCG per tablet Take 1 tablet by mouth daily, Starting Wed 5/15/2024, Normal           No discharge procedures on file.    PDMP Review       None             ED Provider  Attending physically available and evaluated Pacheco Willis. I managed the patient along with the ED Attending.    Electronically Signed by           Rosmery Power MD  06/10/24 0376

## 2024-06-11 ENCOUNTER — OFFICE VISIT (OUTPATIENT)
Dept: FAMILY MEDICINE CLINIC | Facility: CLINIC | Age: 24
End: 2024-06-11
Payer: COMMERCIAL

## 2024-06-11 VITALS
BODY MASS INDEX: 22.02 KG/M2 | HEART RATE: 93 BPM | SYSTOLIC BLOOD PRESSURE: 120 MMHG | HEIGHT: 64 IN | WEIGHT: 129 LBS | DIASTOLIC BLOOD PRESSURE: 90 MMHG | RESPIRATION RATE: 16 BRPM | TEMPERATURE: 98.5 F | OXYGEN SATURATION: 100 %

## 2024-06-11 DIAGNOSIS — R51.9 PERSISTENT HEADACHES: Primary | ICD-10-CM

## 2024-06-11 PROCEDURE — 99213 OFFICE O/P EST LOW 20 MIN: CPT | Performed by: NURSE PRACTITIONER

## 2024-06-11 RX ORDER — PREDNISONE 10 MG/1
TABLET ORAL
Qty: 20 TABLET | Refills: 0 | Status: SHIPPED | OUTPATIENT
Start: 2024-06-11

## 2024-06-11 NOTE — PROGRESS NOTES
FAMILY PRACTICE OFFICE VISIT       NAME: Pacheco Willis  AGE: 23 y.o. SEX: female       : 2000        MRN: [id number]    Assessment and Plan   1. Persistent headaches  -     predniSONE 10 mg tablet; Take 4 tablets for 2 days, 3 tablets for 2 days, 2 tablets for 2 days, 1 tablet for 2 days.       Pacheco presents today for a headache consistent with a migraine for the past one week. She was seen in the ER on 24 for this treated with Reglan, Benadryl, Magnesium IV, NSS IV infusion, and Toradol. Headache improved after this, but returned the next day.     I suspect migraine is from OCP. She has tried OCP several times now, generally does not do well with it, from mood perspective, and now likely cause of persistent migraine.   We spoke about this today. She understands. She really would like to be able to continue OCP, given she has only been taking it one month, and migraine occurred with menses, we agree to continue trial of same OCP, and continue to monitor.     Will trial prednisone taper to see if this resolves current migraine.     Work note provided. She will call me if headache persists or returns.           Chief Complaint     Chief Complaint   Patient presents with    Headache     Patient is here for ongoing headaches, EMERGENCY ROOM        History of Present Illness     Pacheco Willis is a 23 year old female presenting today for headaches.    Has had a headache since last Monday.,    Went to the Er on Wednesday.   Got better with meds administered in the ER,  and then came back the next day.     Right frontal region.   Yesterday, bilateral.   Stabbing pain to pressure sensation.   Hard to concentrate.   Light and sound sensitivity.   Nausea +.    Has had headaches in the past like this.   It has been a long time since she has had headaches.  In the past lasted 2-3 days then resolved.     Tylenol and advil not helping.     No fevers, chills, sweats.   Not sleeping well. Pain  "prevents her from sleeping.   Does not wake her up.   After waking from sleep has about 30 minutes until headache restarts.     Fluids has increased since headaches.   Eating her normal foods.   Does skips meals at times. This is normal for her, not new.     Has been on current birth control pill for one month.   Emotional at start of period, sad.     OCP may give her headache.   LMP June first. Last day of bleeding was yesterday.                       Review of Systems   Review of Systems   Constitutional: Negative.    HENT: Negative.     Eyes:  Positive for photophobia.   Respiratory: Negative.     Cardiovascular: Negative.    Gastrointestinal:  Positive for nausea. Negative for vomiting.   Neurological:  Positive for headaches.   Psychiatric/Behavioral:  Positive for dysphoric mood.        I have reviewed the patient's medical history in detail; there are no changes to the history as noted in the electronic medical record.    Objective     Vitals:    06/11/24 1135   BP: 120/90   Pulse: 93   Resp: 16   Temp: 98.5 °F (36.9 °C)   TempSrc: Temporal   SpO2: 100%   Weight: 58.5 kg (129 lb)   Height: 5' 3.75\" (1.619 m)     Wt Readings from Last 3 Encounters:   06/11/24 58.5 kg (129 lb)   05/15/24 59.6 kg (131 lb 6.4 oz)   03/29/24 62.3 kg (137 lb 6.4 oz)     Physical Exam  Vitals and nursing note reviewed.   Constitutional:       General: She is not in acute distress.     Appearance: Normal appearance. She is not ill-appearing.   HENT:      Head: Atraumatic.      Right Ear: Tympanic membrane normal.      Left Ear: Tympanic membrane normal.      Nose: Nose normal.      Mouth/Throat:      Mouth: Mucous membranes are moist.      Pharynx: Oropharynx is clear.   Eyes:      Extraocular Movements: Extraocular movements intact.      Conjunctiva/sclera: Conjunctivae normal.      Pupils: Pupils are equal, round, and reactive to light.   Cardiovascular:      Rate and Rhythm: Normal rate and regular rhythm.      Heart sounds: No " murmur heard.  Pulmonary:      Effort: Pulmonary effort is normal.      Breath sounds: Normal breath sounds.   Musculoskeletal:      Cervical back: Normal range of motion and neck supple.      Right lower leg: No edema.      Left lower leg: No edema.   Lymphadenopathy:      Cervical: No cervical adenopathy.   Skin:     General: Skin is warm and dry.   Neurological:      Mental Status: She is alert.      Cranial Nerves: No cranial nerve deficit.      Coordination: Coordination normal.      Gait: Gait normal.   Psychiatric:         Mood and Affect: Mood normal.            ALLERGIES:  Allergies   Allergen Reactions    Pollen Extract     Short Ragweed Pollen Ext        Current Medications     Current Outpatient Medications   Medication Sig Dispense Refill    albuterol (PROVENTIL HFA,VENTOLIN HFA) 90 mcg/act inhaler Inhale 2 puffs every 6 (six) hours as needed for wheezing or shortness of breath 8 g 0    fluticasone (FLONASE) 50 mcg/act nasal spray 2 sprays into each nostril daily 16 g 0    hydrOXYzine HCL (ATARAX) 10 mg tablet Take 1 tablet (10 mg total) by mouth 3 (three) times a day as needed for anxiety 30 tablet 0    norethindrone-ethinyl estradiol (Loestrin Fe 1/20) 1-20 MG-MCG per tablet Take 1 tablet by mouth daily 84 tablet 3    predniSONE 10 mg tablet Take 4 tablets for 2 days, 3 tablets for 2 days, 2 tablets for 2 days, 1 tablet for 2 days. 20 tablet 0     No current facility-administered medications for this visit.         Health Maintenance     Health Maintenance   Topic Date Due    Hepatitis C Screening  Never done    Pneumococcal Vaccine: Pediatrics (0 to 5 Years) and At-Risk Patients (6 to 64 Years) (1 of 2 - PCV) 11/26/2008    HIV Screening  Never done    Chlamydia Screening  Never done    COVID-19 Vaccine (3 - 2023-24 season) 09/01/2023    Cervical Cancer Screening  Never done    DTaP,Tdap,and Td Vaccines (7 - Td or Tdap) 09/05/2024    Annual Physical  12/28/2024    Depression Screening  03/29/2025     Depression Follow-up Plan  03/29/2025    Zoster Vaccine (1 of 2) 11/26/2052    RSV Vaccine Age 60+ Years (1 - 1-dose 60+ series) 11/26/2062    HIB Vaccine  Completed    IPV Vaccine  Completed    Hepatitis A Vaccine  Completed    Meningococcal ACWY Vaccine  Completed    Influenza Vaccine  Completed    HPV Vaccine  Completed    RSV Vaccine age 0-20 Months  Aged Out     Immunization History   Administered Date(s) Administered    COVID-19 PFIZER VACCINE 0.3 ML IM 06/19/2021, 07/10/2021    DTaP 02/05/2003, 03/26/2003, 06/07/2003, 03/01/2004, 01/22/2007    DTaP 5 02/05/2003, 03/26/2003, 06/07/2003, 03/01/2004, 01/22/2007    HPV Quadrivalent 09/05/2014, 11/18/2014, 03/23/2015    Hep A, adult 09/12/2013, 09/05/2014    Hep B, Adolescent or Pediatric 2002, 01/06/2003, 06/07/2003    Hep B, adult 2002, 01/06/2003, 06/07/2003    Hepatitis A 09/12/2013    HiB 02/05/2003, 03/26/2003, 06/07/2003, 03/01/2004    Hib (PRP-OMP) 02/05/2003, 03/26/2003, 06/07/2003, 03/01/2004    INFLUENZA 11/13/2008, 11/17/2010, 09/22/2011, 10/23/2017, 09/23/2019, 09/25/2020, 09/23/2021, 09/28/2023    IPV 02/05/2003, 03/26/2003, 06/07/2003, 01/22/2007    Influenza Quadrivalent Preservative Free 3 years and older IM 09/21/2016, 10/23/2017    Influenza, injectable, quadrivalent, preservative free 0.5 mL 10/09/2018, 09/23/2019, 09/25/2020, 09/23/2021    Influenza, seasonal, injectable 11/13/2008, 11/17/2010, 09/22/2011, 11/18/2014, 10/27/2015    MMR 12/16/2003, 01/22/2007    Meningococcal MCV4, Unspecified 04/19/2019    Meningococcal MCV4P 04/19/2019    Meningococcal, Unknown Serogroups 09/05/2014    Pneumococcal Conjugate PCV 7 02/05/2003, 03/26/2003, 06/07/2003, 12/16/2003    Tdap 09/05/2014    Varicella 12/16/2003, 02/15/2008       [facility]ELIS Ambriz

## 2024-06-11 NOTE — LETTER
June 11, 2024     Patient: Pacheco Willis  YOB: 2000  Date of Visit: 6/11/2024      To Whom it May Concern:    Pacheco Willis is under my professional care. Pacheco was seen in my office on 6/11/2024. Pacheco may return to work on 6/12/2024 .    If you have any questions or concerns, please don't hesitate to call.         Sincerely,          ARELIS Ambriz        CC: No Recipients

## 2024-06-12 ENCOUNTER — TELEPHONE (OUTPATIENT)
Age: 24
End: 2024-06-12

## 2024-06-12 ENCOUNTER — TELEPHONE (OUTPATIENT)
Dept: FAMILY MEDICINE CLINIC | Facility: CLINIC | Age: 24
End: 2024-06-12

## 2024-06-12 NOTE — TELEPHONE ENCOUNTER
Patients mom called asking if you can extend her drs note as pt is still not feeling any better please advise.

## 2024-06-12 NOTE — TELEPHONE ENCOUNTER
Patients mother is calling to request another excuse for work for the patient, she states the provider told her that if she wasn't feeling better that she would be able to get a letter for work for an additional day, please upload to MY CHART  Thank you

## 2024-06-14 ENCOUNTER — OFFICE VISIT (OUTPATIENT)
Dept: FAMILY MEDICINE CLINIC | Facility: CLINIC | Age: 24
End: 2024-06-14
Payer: COMMERCIAL

## 2024-06-14 ENCOUNTER — APPOINTMENT (OUTPATIENT)
Dept: LAB | Facility: CLINIC | Age: 24
End: 2024-06-14
Payer: COMMERCIAL

## 2024-06-14 DIAGNOSIS — R51.9 PERSISTENT HEADACHES: ICD-10-CM

## 2024-06-14 DIAGNOSIS — H53.8 BLURRY VISION, BILATERAL: ICD-10-CM

## 2024-06-14 DIAGNOSIS — R51.9 PERSISTENT HEADACHES: Primary | ICD-10-CM

## 2024-06-14 LAB
25(OH)D3 SERPL-MCNC: 16.3 NG/ML (ref 30–100)
ALBUMIN SERPL BCP-MCNC: 4.5 G/DL (ref 3.5–5)
ALP SERPL-CCNC: 48 U/L (ref 34–104)
ALT SERPL W P-5'-P-CCNC: 8 U/L (ref 7–52)
ANA SER QL IA: NEGATIVE
ANION GAP SERPL CALCULATED.3IONS-SCNC: 11 MMOL/L (ref 4–13)
AST SERPL W P-5'-P-CCNC: 10 U/L (ref 13–39)
B BURGDOR IGG+IGM SER QL IA: NEGATIVE
BASOPHILS # BLD AUTO: 0.02 THOUSANDS/ÂΜL (ref 0–0.1)
BASOPHILS NFR BLD AUTO: 0 % (ref 0–1)
BILIRUB SERPL-MCNC: 0.47 MG/DL (ref 0.2–1)
BUN SERPL-MCNC: 15 MG/DL (ref 5–25)
CALCIUM SERPL-MCNC: 9.4 MG/DL (ref 8.4–10.2)
CHLORIDE SERPL-SCNC: 102 MMOL/L (ref 96–108)
CO2 SERPL-SCNC: 28 MMOL/L (ref 21–32)
CREAT SERPL-MCNC: 0.62 MG/DL (ref 0.6–1.3)
CRP SERPL QL: <1 MG/L
EOSINOPHIL # BLD AUTO: 0.01 THOUSAND/ÂΜL (ref 0–0.61)
EOSINOPHIL NFR BLD AUTO: 0 % (ref 0–6)
ERYTHROCYTE [DISTWIDTH] IN BLOOD BY AUTOMATED COUNT: 12.3 % (ref 11.6–15.1)
ERYTHROCYTE [SEDIMENTATION RATE] IN BLOOD: 5 MM/HOUR (ref 0–19)
EST. AVERAGE GLUCOSE BLD GHB EST-MCNC: 91 MG/DL
GFR SERPL CREATININE-BSD FRML MDRD: 129 ML/MIN/1.73SQ M
GLUCOSE P FAST SERPL-MCNC: 115 MG/DL (ref 65–99)
HBA1C MFR BLD: 4.8 %
HCT VFR BLD AUTO: 40.9 % (ref 34.8–46.1)
HGB BLD-MCNC: 13.3 G/DL (ref 11.5–15.4)
IMM GRANULOCYTES # BLD AUTO: 0.02 THOUSAND/UL (ref 0–0.2)
IMM GRANULOCYTES NFR BLD AUTO: 0 % (ref 0–2)
LYMPHOCYTES # BLD AUTO: 2.46 THOUSANDS/ÂΜL (ref 0.6–4.47)
LYMPHOCYTES NFR BLD AUTO: 34 % (ref 14–44)
MCH RBC QN AUTO: 31 PG (ref 26.8–34.3)
MCHC RBC AUTO-ENTMCNC: 32.5 G/DL (ref 31.4–37.4)
MCV RBC AUTO: 95 FL (ref 82–98)
MONOCYTES # BLD AUTO: 0.28 THOUSAND/ÂΜL (ref 0.17–1.22)
MONOCYTES NFR BLD AUTO: 4 % (ref 4–12)
NEUTROPHILS # BLD AUTO: 4.46 THOUSANDS/ÂΜL (ref 1.85–7.62)
NEUTS SEG NFR BLD AUTO: 62 % (ref 43–75)
NRBC BLD AUTO-RTO: 0 /100 WBCS
PLATELET # BLD AUTO: 257 THOUSANDS/UL (ref 149–390)
PMV BLD AUTO: 11.1 FL (ref 8.9–12.7)
POTASSIUM SERPL-SCNC: 3.6 MMOL/L (ref 3.5–5.3)
PROT SERPL-MCNC: 7.3 G/DL (ref 6.4–8.4)
RBC # BLD AUTO: 4.29 MILLION/UL (ref 3.81–5.12)
SODIUM SERPL-SCNC: 141 MMOL/L (ref 135–147)
TSH SERPL DL<=0.05 MIU/L-ACNC: 2.55 UIU/ML (ref 0.45–4.5)
VIT B12 SERPL-MCNC: 240 PG/ML (ref 180–914)
WBC # BLD AUTO: 7.25 THOUSAND/UL (ref 4.31–10.16)

## 2024-06-14 PROCEDURE — 86430 RHEUMATOID FACTOR TEST QUAL: CPT

## 2024-06-14 PROCEDURE — 86140 C-REACTIVE PROTEIN: CPT

## 2024-06-14 PROCEDURE — 86235 NUCLEAR ANTIGEN ANTIBODY: CPT

## 2024-06-14 PROCEDURE — 80053 COMPREHEN METABOLIC PANEL: CPT

## 2024-06-14 PROCEDURE — 99213 OFFICE O/P EST LOW 20 MIN: CPT | Performed by: NURSE PRACTITIONER

## 2024-06-14 PROCEDURE — 86618 LYME DISEASE ANTIBODY: CPT

## 2024-06-14 PROCEDURE — 83036 HEMOGLOBIN GLYCOSYLATED A1C: CPT

## 2024-06-14 PROCEDURE — 82607 VITAMIN B-12: CPT

## 2024-06-14 PROCEDURE — 85025 COMPLETE CBC W/AUTO DIFF WBC: CPT

## 2024-06-14 PROCEDURE — 85652 RBC SED RATE AUTOMATED: CPT

## 2024-06-14 PROCEDURE — 36415 COLL VENOUS BLD VENIPUNCTURE: CPT

## 2024-06-14 PROCEDURE — 86038 ANTINUCLEAR ANTIBODIES: CPT

## 2024-06-14 PROCEDURE — 86200 CCP ANTIBODY: CPT

## 2024-06-14 PROCEDURE — 84443 ASSAY THYROID STIM HORMONE: CPT

## 2024-06-14 PROCEDURE — 82306 VITAMIN D 25 HYDROXY: CPT

## 2024-06-14 NOTE — PROGRESS NOTES
FAMILY PRACTICE OFFICE VISIT       NAME: Pacheco Willis  AGE: 23 y.o. SEX: female       : 2000        MRN: [id number]    Assessment and Plan   1. Persistent headaches  -     CBC and differential; Future  -     Comprehensive metabolic panel; Future  -     Hemoglobin A1C; Future  -     TSH, 3rd generation; Future  -     Vitamin B12; Future  -     Vitamin D 25 hydroxy; Future  -     MALINI Screen w/ Reflex to Titer/Pattern; Future  -     C-reactive protein; Future  -     Cyclic citrul peptide antibody, IgG; Future  -     RF Screen w/ Reflex to Titer; Future  -     Sjogren's Antibodies; Future  -     Sedimentation rate, automated; Future  -     MRI brain w wo contrast; Future; Expected date: 2024  -     Lyme Total AB W Reflex to IGM/IGG; Future  2. Blurry vision, bilateral  -     CBC and differential; Future  -     Comprehensive metabolic panel; Future  -     Hemoglobin A1C; Future  -     TSH, 3rd generation; Future  -     Vitamin B12; Future  -     Vitamin D 25 hydroxy; Future  -     MALINI Screen w/ Reflex to Titer/Pattern; Future  -     C-reactive protein; Future  -     Cyclic citrul peptide antibody, IgG; Future  -     RF Screen w/ Reflex to Titer; Future  -     Sjogren's Antibodies; Future  -     Sedimentation rate, automated; Future  -     MRI brain w wo contrast; Future; Expected date: 2024  -     Lyme Total AB W Reflex to IGM/IGG; Future     Check blood work as noted.   MRI brain to assess for any mass, clot, hemorrhage.   Continue prednisone taper.   Continue to suspect headaches resurfaced due to OCP, if workup is negative, and headaches not improving, will need to stop OCP.     Chief Complaint     Chief Complaint   Patient presents with    Headache       History of Present Illness     Pacheco Willis is a 23 year old female presenting today for headaches.     She was seen earlier this week for same symptoms.   Currently on Prednisone taper at 30 mg right now.     Yesterday, she  "was feeling better, no headaches, but stepped out into the sunlight to go to work, and headache came back.     Still has headaches.   No sore throat, cough, runny nose, just headaches.   Tired all the time.  No fevers.     Night sweats--but room is hot.     Stabbing hip pain last night bilateral, but gone today    Doesn't really spend time outdoors.   Has dogs at home they are treated with preventive flea and tick medications.     +nausea.   Some vision changes at times.     Neck is sore holding head up, but resolves if lying down, or resting her head back.         Review of Systems   Review of Systems   Constitutional:  Positive for fatigue. Negative for chills, diaphoresis and fever.   HENT: Negative.     Respiratory: Negative.     Cardiovascular: Negative.    Gastrointestinal: Negative.    Musculoskeletal:  Positive for neck pain.   Skin: Negative.    Neurological:  Positive for headaches. Negative for dizziness, facial asymmetry, light-headedness and numbness.       I have reviewed the patient's medical history in detail; there are no changes to the history as noted in the electronic medical record.    Objective     Vitals:    06/14/24 0927 06/14/24 0944   BP:  106/80   BP Location:  Left arm   Pulse:  80   Resp:  20   Temp:  98.8 °F (37.1 °C)   TempSrc:  Tympanic   Weight: 58.5 kg (129 lb)    Height: 5' 3.75\" (1.619 m)      Wt Readings from Last 3 Encounters:   06/14/24 58.5 kg (129 lb)   06/11/24 58.5 kg (129 lb)   05/15/24 59.6 kg (131 lb 6.4 oz)     Physical Exam  Vitals and nursing note reviewed.   Constitutional:       Appearance: Normal appearance. She is not ill-appearing.   HENT:      Head: Atraumatic.      Right Ear: Tympanic membrane normal.      Left Ear: Tympanic membrane normal.      Mouth/Throat:      Mouth: Mucous membranes are moist.      Pharynx: Oropharynx is clear.   Eyes:      Conjunctiva/sclera: Conjunctivae normal.      Pupils: Pupils are equal, round, and reactive to light. "   Cardiovascular:      Rate and Rhythm: Normal rate and regular rhythm.      Heart sounds: No murmur heard.  Pulmonary:      Effort: Pulmonary effort is normal.      Breath sounds: Normal breath sounds.   Musculoskeletal:      Cervical back: Normal range of motion and neck supple.      Right lower leg: No edema.      Left lower leg: No edema.   Lymphadenopathy:      Cervical: No cervical adenopathy.   Neurological:      General: No focal deficit present.      Mental Status: She is alert.      Cranial Nerves: No cranial nerve deficit.      Gait: Gait normal.   Psychiatric:         Mood and Affect: Mood normal.            ALLERGIES:  Allergies   Allergen Reactions    Pollen Extract     Short Ragweed Pollen Ext        Current Medications     Current Outpatient Medications   Medication Sig Dispense Refill    albuterol (PROVENTIL HFA,VENTOLIN HFA) 90 mcg/act inhaler Inhale 2 puffs every 6 (six) hours as needed for wheezing or shortness of breath 8 g 0    fluticasone (FLONASE) 50 mcg/act nasal spray 2 sprays into each nostril daily 16 g 0    hydrOXYzine HCL (ATARAX) 10 mg tablet Take 1 tablet (10 mg total) by mouth 3 (three) times a day as needed for anxiety 30 tablet 0    norethindrone-ethinyl estradiol (Loestrin Fe 1/20) 1-20 MG-MCG per tablet Take 1 tablet by mouth daily 84 tablet 3    predniSONE 10 mg tablet Take 4 tablets for 2 days, 3 tablets for 2 days, 2 tablets for 2 days, 1 tablet for 2 days. 20 tablet 0     No current facility-administered medications for this visit.         Health Maintenance     Health Maintenance   Topic Date Due    Hepatitis C Screening  Never done    Pneumococcal Vaccine: Pediatrics (0 to 5 Years) and At-Risk Patients (6 to 64 Years) (1 of 2 - PCV) 11/26/2008    HIV Screening  Never done    Chlamydia Screening  Never done    COVID-19 Vaccine (3 - 2023-24 season) 09/01/2023    Cervical Cancer Screening  Never done    DTaP,Tdap,and Td Vaccines (7 - Td or Tdap) 09/05/2024    Annual Physical   12/28/2024    Depression Screening  03/29/2025    Depression Follow-up Plan  03/29/2025    Zoster Vaccine (1 of 2) 11/26/2052    RSV Vaccine Age 60+ Years (1 - 1-dose 60+ series) 11/26/2062    HIB Vaccine  Completed    IPV Vaccine  Completed    Hepatitis A Vaccine  Completed    Meningococcal ACWY Vaccine  Completed    Influenza Vaccine  Completed    HPV Vaccine  Completed    RSV Vaccine age 0-20 Months  Aged Out     Immunization History   Administered Date(s) Administered    COVID-19 PFIZER VACCINE 0.3 ML IM 06/19/2021, 07/10/2021    DTaP 02/05/2003, 03/26/2003, 06/07/2003, 03/01/2004, 01/22/2007    DTaP 5 02/05/2003, 03/26/2003, 06/07/2003, 03/01/2004, 01/22/2007    HPV Quadrivalent 09/05/2014, 11/18/2014, 03/23/2015    Hep A, adult 09/12/2013, 09/05/2014    Hep B, Adolescent or Pediatric 2002, 01/06/2003, 06/07/2003    Hep B, adult 2002, 01/06/2003, 06/07/2003    Hepatitis A 09/12/2013    HiB 02/05/2003, 03/26/2003, 06/07/2003, 03/01/2004    Hib (PRP-OMP) 02/05/2003, 03/26/2003, 06/07/2003, 03/01/2004    INFLUENZA 11/13/2008, 11/17/2010, 09/22/2011, 10/23/2017, 09/23/2019, 09/25/2020, 09/23/2021, 09/28/2023    IPV 02/05/2003, 03/26/2003, 06/07/2003, 01/22/2007    Influenza Quadrivalent Preservative Free 3 years and older IM 09/21/2016, 10/23/2017    Influenza, injectable, quadrivalent, preservative free 0.5 mL 10/09/2018, 09/23/2019, 09/25/2020, 09/23/2021    Influenza, seasonal, injectable 11/13/2008, 11/17/2010, 09/22/2011, 11/18/2014, 10/27/2015    MMR 12/16/2003, 01/22/2007    Meningococcal MCV4, Unspecified 04/19/2019    Meningococcal MCV4P 04/19/2019    Meningococcal, Unknown Serogroups 09/05/2014    Pneumococcal Conjugate PCV 7 02/05/2003, 03/26/2003, 06/07/2003, 12/16/2003    Tdap 09/05/2014    Varicella 12/16/2003, 02/15/2008       ARELIS Ambriz

## 2024-06-14 NOTE — LETTER
June 14, 2024     Patient: Pacheco Willis  YOB: 2000  Date of Visit: 6/14/2024      To Whom it May Concern:    Pacheco Wilils is under my professional care. Pacheco was seen in my office on 6/14/2024. Pacheco may return to work on 6/17/2024 .    If you have any questions or concerns, please don't hesitate to call.         Sincerely,          ARELIS Ambriz        CC: No Recipients

## 2024-06-15 VITALS
DIASTOLIC BLOOD PRESSURE: 80 MMHG | HEART RATE: 80 BPM | WEIGHT: 129 LBS | BODY MASS INDEX: 22.02 KG/M2 | SYSTOLIC BLOOD PRESSURE: 106 MMHG | HEIGHT: 64 IN | TEMPERATURE: 98.8 F | RESPIRATION RATE: 20 BRPM

## 2024-06-15 LAB
ENA SS-A AB SER-ACNC: <0.2 AI (ref 0–0.9)
ENA SS-B AB SER-ACNC: <0.2 AI (ref 0–0.9)
RHEUMATOID FACT SER QL LA: NEGATIVE

## 2024-06-17 ENCOUNTER — TELEPHONE (OUTPATIENT)
Dept: FAMILY MEDICINE CLINIC | Facility: CLINIC | Age: 24
End: 2024-06-17

## 2024-06-17 NOTE — TELEPHONE ENCOUNTER
Patients mother is returning call from jessi Shore transfer to clinical staff for further assistance

## 2024-06-17 NOTE — TELEPHONE ENCOUNTER
----- Message from ARELIS Hoffmann sent at 6/16/2024  8:40 PM EDT -----  Please contact Pacheco:  Blood work with low vitamin D level--please take vitamin D3 5000 units daily.   Vitamin B12 is low, please take vitamin B12 1000 mcg daily.   These would not likely cause your headaches.   Sugar was a little high, but this is from taking prednisone.   All other blood work is normal.   Please inquire how her headache is after the weekend?

## 2024-06-17 NOTE — TELEPHONE ENCOUNTER
Spoke with mom and she stated that patient still has a slight headache and its tolerable.  Patient slept and ate a lot this past weekend and they babied her a lot.  She will be having her brain scan on Sunday,

## 2024-06-18 LAB — CCP AB SER IA-ACNC: 7.4

## 2024-06-23 ENCOUNTER — HOSPITAL ENCOUNTER (OUTPATIENT)
Dept: RADIOLOGY | Facility: HOSPITAL | Age: 24
Discharge: HOME | End: 2024-06-23
Payer: COMMERCIAL

## 2024-06-23 DIAGNOSIS — H53.8 BLURRY VISION, BILATERAL: ICD-10-CM

## 2024-06-23 DIAGNOSIS — R51.9 PERSISTENT HEADACHES: ICD-10-CM

## 2024-06-23 PROCEDURE — A9585 GADOBUTROL INJECTION: HCPCS | Performed by: NURSE PRACTITIONER

## 2024-06-23 PROCEDURE — 70553 MRI BRAIN STEM W/O & W/DYE: CPT

## 2024-06-23 RX ORDER — GADOBUTROL 604.72 MG/ML
5 INJECTION INTRAVENOUS
Status: COMPLETED | OUTPATIENT
Start: 2024-06-23 | End: 2024-06-23

## 2024-06-23 RX ADMIN — GADOBUTROL 5 ML: 604.72 INJECTION INTRAVENOUS at 04:10

## 2024-06-25 ENCOUNTER — TELEPHONE (OUTPATIENT)
Age: 24
End: 2024-06-25

## 2024-06-25 NOTE — TELEPHONE ENCOUNTER
Spoke with patient via telephone to review recommendations per Dr. Hendrix. Patient agreeable to plan. Advised using a different form of contraceptive in the meantime. Patient will send message once MRI results are back.

## 2024-06-25 NOTE — TELEPHONE ENCOUNTER
Patient and mother calling to inquire about patient's persistent migraine for the last 3 weeks. She has seen her PCP a couple times for the headaches as well as gone to the ED. She's completed a steroid taper and got a brain MRI that has not resulted yet. PCP is considering that headache is from OCP. Patient and mother would like Dr. Hendrix's input as they are unsure if she should continue on the current OCP. Headache is currently above her right eye and she has been experiencing sensitivity to light and sound. Message sent to Dr. Hendrix for review. Awaiting return response.

## 2024-06-27 ENCOUNTER — TELEPHONE (OUTPATIENT)
Dept: FAMILY MEDICINE CLINIC | Facility: CLINIC | Age: 24
End: 2024-06-27

## 2024-06-27 NOTE — TELEPHONE ENCOUNTER
----- Message from ARELIS Hoffmann sent at 6/26/2024  8:43 PM EDT -----  Normal MRI brain.   Please inquire how are headaches?

## 2024-06-28 NOTE — TELEPHONE ENCOUNTER
Spoke to patient, gave results, stated that she has had on and off with pressure and her headaches, she did state that she reached out to her obgyn and they told her to stop taking her birth control, she stated that she hasn't been taking it for the last 2 days, and she felt good yesterday without any headahces

## 2024-07-08 DIAGNOSIS — Z30.011 ENCOUNTER FOR INITIAL PRESCRIPTION OF CONTRACEPTIVE PILLS: Primary | ICD-10-CM

## 2024-07-08 RX ORDER — ACETAMINOPHEN AND CODEINE PHOSPHATE 120; 12 MG/5ML; MG/5ML
1 SOLUTION ORAL DAILY
Qty: 84 TABLET | Refills: 3 | Status: SHIPPED | OUTPATIENT
Start: 2024-07-08

## 2024-07-10 ENCOUNTER — OFFICE VISIT (OUTPATIENT)
Dept: FAMILY MEDICINE CLINIC | Facility: CLINIC | Age: 24
End: 2024-07-10
Payer: COMMERCIAL

## 2024-07-10 VITALS
HEIGHT: 64 IN | SYSTOLIC BLOOD PRESSURE: 120 MMHG | OXYGEN SATURATION: 98 % | TEMPERATURE: 97.8 F | BODY MASS INDEX: 22.02 KG/M2 | HEART RATE: 101 BPM | WEIGHT: 129 LBS | DIASTOLIC BLOOD PRESSURE: 90 MMHG | RESPIRATION RATE: 16 BRPM

## 2024-07-10 DIAGNOSIS — R51.9 FREQUENT HEADACHES: Primary | ICD-10-CM

## 2024-07-10 DIAGNOSIS — R55 VASOVAGAL SYNCOPE: ICD-10-CM

## 2024-07-10 PROCEDURE — 99214 OFFICE O/P EST MOD 30 MIN: CPT | Performed by: NURSE PRACTITIONER

## 2024-07-10 NOTE — PROGRESS NOTES
FAMILY PRACTICE OFFICE VISIT       NAME: Pacheco Willis  AGE: 23 y.o. SEX: female       : 2000        MRN: [id number]    Assessment and Plan   1. Frequent headaches  Headaches resolved with discontinuation of combined OCP.   Reviewed recent MRI brain results today.   Will trial progesterone only OCP. We discussed importance of taking this pill same time every day, using backup birth control if she misses a pill.   2. Vasovagal syncope  Syncopal episode, consistent with vasovagal syncope.   She will call if any further episodes occur.            Chief Complaint     Chief Complaint   Patient presents with    Follow-up     Patient is here for f/u MRI results and passed out 2 wks ago       History of Present Illness     Pacheco Willis is a 23 year old female presenting today for syncopal episode.     Passed out in shower.   Felt like she did when she had blood drawn.  Knew it was coming.   Called her boyfriend, told him how she was feeling and he was able to catch her.   She was out for 45 seconds.   Had not tremors.    Was pushing fluids and eating regularly.    This happened once years ago in 7th grade had ED work up. Told vasovagal. Had no further episodes until now.     No palpitations, no heart racing, no dizziness.     Past few days feeling anxious.   Mood, stressed.  Not depressed.  Puts a lot of pressure on herself to perform well at work.   Overthinks things.   Currently her and dad aren't speaking over a disagreement, this is hard on Pacheco, she is very close to dad.       Accompanied by mom today.                  Review of Systems   Review of Systems   Constitutional: Negative.    HENT: Negative.     Respiratory:  Negative for shortness of breath.    Cardiovascular: Negative.    Gastrointestinal: Negative.    Genitourinary: Negative.    Musculoskeletal: Negative.    Neurological:  Positive for syncope.   Psychiatric/Behavioral:  Negative for dysphoric mood, self-injury, sleep  "disturbance and suicidal ideas. The patient is nervous/anxious.        I have reviewed the patient's medical history in detail; there are no changes to the history as noted in the electronic medical record.    Objective     Vitals:    07/10/24 0907   BP: 120/90   Pulse: 101   Resp: 16   Temp: 97.8 °F (36.6 °C)   TempSrc: Temporal   SpO2: 98%   Weight: 58.5 kg (129 lb)   Height: 5' 3.75\" (1.619 m)     Wt Readings from Last 3 Encounters:   07/10/24 58.5 kg (129 lb)   06/14/24 58.5 kg (129 lb)   06/11/24 58.5 kg (129 lb)     Physical Exam  Vitals and nursing note reviewed.   Constitutional:       General: She is not in acute distress.     Appearance: Normal appearance. She is not ill-appearing.   Cardiovascular:      Rate and Rhythm: Normal rate and regular rhythm.      Heart sounds: No murmur heard.  Pulmonary:      Effort: Pulmonary effort is normal. No respiratory distress.      Breath sounds: Normal breath sounds. No wheezing or rales.   Musculoskeletal:      Right lower leg: No edema.      Left lower leg: No edema.   Skin:     General: Skin is warm and dry.      Findings: No rash.   Neurological:      Mental Status: She is alert.      Cranial Nerves: No cranial nerve deficit.      Coordination: Coordination normal.      Gait: Gait normal.   Psychiatric:         Mood and Affect: Mood normal.            ALLERGIES:  Allergies   Allergen Reactions    Pollen Extract     Short Ragweed Pollen Ext        Current Medications     Current Outpatient Medications   Medication Sig Dispense Refill    albuterol (PROVENTIL HFA,VENTOLIN HFA) 90 mcg/act inhaler Inhale 2 puffs every 6 (six) hours as needed for wheezing or shortness of breath 8 g 0    fluticasone (FLONASE) 50 mcg/act nasal spray 2 sprays into each nostril daily 16 g 0    hydrOXYzine HCL (ATARAX) 10 mg tablet Take 1 tablet (10 mg total) by mouth 3 (three) times a day as needed for anxiety 30 tablet 0    norethindrone (Adrianne) 0.35 MG tablet Take 1 tablet (0.35 mg " total) by mouth daily (Patient not taking: Reported on 7/10/2024) 84 tablet 3     No current facility-administered medications for this visit.         Health Maintenance     Health Maintenance   Topic Date Due    Hepatitis C Screening  Never done    Pneumococcal Vaccine: Pediatrics (0 to 5 Years) and At-Risk Patients (6 to 64 Years) (1 of 2 - PCV) 11/26/2008    HIV Screening  Never done    Chlamydia Screening  Never done    COVID-19 Vaccine (3 - 2023-24 season) 09/01/2023    Cervical Cancer Screening  Never done    Influenza Vaccine (1) 09/01/2024    DTaP,Tdap,and Td Vaccines (7 - Td or Tdap) 09/05/2024    Annual Physical  12/28/2024    Depression Screening  03/29/2025    Depression Follow-up Plan  03/29/2025    Zoster Vaccine (1 of 2) 11/26/2052    RSV Vaccine Age 60+ Years (1 - 1-dose 60+ series) 11/26/2062    HIB Vaccine  Completed    IPV Vaccine  Completed    Hepatitis A Vaccine  Completed    Meningococcal ACWY Vaccine  Completed    HPV Vaccine  Completed    RSV Vaccine age 0-20 Months  Aged Out     Immunization History   Administered Date(s) Administered    COVID-19 PFIZER VACCINE 0.3 ML IM 06/19/2021, 07/10/2021    DTaP 02/05/2003, 03/26/2003, 06/07/2003, 03/01/2004, 01/22/2007    DTaP 5 02/05/2003, 03/26/2003, 06/07/2003, 03/01/2004, 01/22/2007    HPV Quadrivalent 09/05/2014, 11/18/2014, 03/23/2015    Hep A, adult 09/12/2013, 09/05/2014    Hep B, Adolescent or Pediatric 2002, 01/06/2003, 06/07/2003    Hep B, adult 2002, 01/06/2003, 06/07/2003    Hepatitis A 09/12/2013    HiB 02/05/2003, 03/26/2003, 06/07/2003, 03/01/2004    Hib (PRP-OMP) 02/05/2003, 03/26/2003, 06/07/2003, 03/01/2004    INFLUENZA 11/13/2008, 11/17/2010, 09/22/2011, 10/23/2017, 09/23/2019, 09/25/2020, 09/23/2021, 09/28/2023    IPV 02/05/2003, 03/26/2003, 06/07/2003, 01/22/2007    Influenza Quadrivalent Preservative Free 3 years and older IM 09/21/2016, 10/23/2017    Influenza, injectable, quadrivalent, preservative free 0.5 mL  10/09/2018, 09/23/2019, 09/25/2020, 09/23/2021    Influenza, seasonal, injectable 11/13/2008, 11/17/2010, 09/22/2011, 11/18/2014, 10/27/2015    MMR 12/16/2003, 01/22/2007    Meningococcal MCV4, Unspecified 04/19/2019    Meningococcal MCV4P 04/19/2019    Meningococcal, Unknown Serogroups 09/05/2014    Pneumococcal Conjugate PCV 7 02/05/2003, 03/26/2003, 06/07/2003, 12/16/2003    Tdap 09/05/2014    Varicella 12/16/2003, 02/15/2008       ARELIS Ambriz

## 2024-08-19 ENCOUNTER — ANNUAL EXAM (OUTPATIENT)
Age: 24
End: 2024-08-19
Payer: COMMERCIAL

## 2024-08-19 VITALS
HEIGHT: 64 IN | DIASTOLIC BLOOD PRESSURE: 86 MMHG | WEIGHT: 127.4 LBS | BODY MASS INDEX: 21.75 KG/M2 | SYSTOLIC BLOOD PRESSURE: 112 MMHG

## 2024-08-19 DIAGNOSIS — Z01.419 ENCOUNTER FOR ANNUAL ROUTINE GYNECOLOGICAL EXAMINATION: Primary | ICD-10-CM

## 2024-08-19 DIAGNOSIS — Z11.3 SCREENING FOR STD (SEXUALLY TRANSMITTED DISEASE): ICD-10-CM

## 2024-08-19 DIAGNOSIS — Z12.4 SCREENING FOR CERVICAL CANCER: ICD-10-CM

## 2024-08-19 PROCEDURE — 87591 N.GONORRHOEAE DNA AMP PROB: CPT | Performed by: OBSTETRICS & GYNECOLOGY

## 2024-08-19 PROCEDURE — G0145 SCR C/V CYTO,THINLAYER,RESCR: HCPCS | Performed by: OBSTETRICS & GYNECOLOGY

## 2024-08-19 PROCEDURE — 87491 CHLMYD TRACH DNA AMP PROBE: CPT | Performed by: OBSTETRICS & GYNECOLOGY

## 2024-08-19 PROCEDURE — S0612 ANNUAL GYNECOLOGICAL EXAMINA: HCPCS | Performed by: OBSTETRICS & GYNECOLOGY

## 2024-08-19 NOTE — PROGRESS NOTES
"Assessment/Plan:    1. Encounter for annual routine gynecological examination      2. Screening for cervical cancer    - Liquid-based pap, screening    3. Screening for STD (sexually transmitted disease)    - Chlamydia/GC amplified DNA by PCR        Subjective      Pacheco Willis is a 23 y.o. female who presents for annual exam. Periods are well-controlled with Adrianne; IGLESIAS's resolved.  She denies any breast or sexual concerns.    Current contraception: oral progesterone-only contraceptive  History of abnormal Pap smear: no  Regular self breast exam: yes  History of abnormal mammogram: no  History of abnormal lipids: no    Menstrual History:  Menarche age: 16  Patient's last menstrual period was 07/29/2024 (exact date).  Period Cycle (Days): 28  Period Duration (Days): 4  Period Pattern: Regular  Menstrual Flow: Heavy  Menstrual Control: Maxi pad  Menstrual Control Change Freq (Hours): 2-3  Dysmenorrhea: (!) Mild  Dysmenorrhea Symptoms: Cramping, Diarrhea    Past Medical History:   Diagnosis Date    Asthma N/A    Heart murmur     In-toeing     Leg length discrepancy        Family History   Problem Relation Age of Onset    Mental illness Mother         anxiety, depression    Hypothyroidism Mother     Hypertension Father     Coronary artery disease Father     Other Father         Hypoalbuminemia    Mental illness Sister         anxiety, depression    Autism spectrum disorder Brother     Heart attack Paternal Grandfather     Diabetes Paternal Grandfather     Diabetes Family     Heart disease Family     Obesity Family        The following portions of the patient's history were reviewed and updated as appropriate: allergies, current medications, past family history, past medical history, past social history, past surgical history, and problem list.    Review of Systems  Pertinent items are noted in HPI.      Objective      /86 (BP Location: Right arm, Patient Position: Sitting, Cuff Size: Standard)   Ht 5' 4\" " (1.626 m)   Wt 57.8 kg (127 lb 6.4 oz)   LMP 07/29/2024 (Exact Date)   BMI 21.87 kg/m²     General:   alert and oriented, in no acute distress   Heart:  Breasts: regular rate and rhythm  appear normal, no suspicious masses, no skin or nipple changes or axillary nodes.   Lungs: Effort normal   Abdomen: soft, non-tender, without masses or organomegaly   Vulva: normal   Vagina: normal mucosa   Cervix: no lesions   Uterus: normal size, mobile, non-tender   Adnexa:  Bladder: normal adnexa and no mass, fullness, tenderness  Non-tender

## 2024-08-21 LAB
C TRACH DNA SPEC QL NAA+PROBE: NEGATIVE
N GONORRHOEA DNA SPEC QL NAA+PROBE: NEGATIVE

## 2024-08-23 LAB
LAB AP GYN PRIMARY INTERPRETATION: NORMAL
Lab: NORMAL

## 2024-10-17 ENCOUNTER — PATIENT MESSAGE (OUTPATIENT)
Age: 24
End: 2024-10-17

## 2024-10-17 NOTE — PATIENT COMMUNICATION
Left message for patient to start a new pack today.  She is taking a progesterone only pill.  Advised she may have BTB if she missed a few days.  Advised check HPT if she missed pills and was sexually active.

## 2024-11-15 DIAGNOSIS — Z30.011 ENCOUNTER FOR INITIAL PRESCRIPTION OF CONTRACEPTIVE PILLS: ICD-10-CM

## 2024-11-15 RX ORDER — ACETAMINOPHEN AND CODEINE PHOSPHATE 120; 12 MG/5ML; MG/5ML
1 SOLUTION ORAL DAILY
Qty: 84 TABLET | Refills: 1 | Status: SHIPPED | OUTPATIENT
Start: 2024-11-15

## 2024-11-26 ENCOUNTER — OFFICE VISIT (OUTPATIENT)
Dept: FAMILY MEDICINE CLINIC | Facility: CLINIC | Age: 24
End: 2024-11-26
Payer: COMMERCIAL

## 2024-11-26 VITALS
HEIGHT: 64 IN | RESPIRATION RATE: 16 BRPM | HEART RATE: 107 BPM | WEIGHT: 130 LBS | TEMPERATURE: 100.1 F | OXYGEN SATURATION: 97 % | SYSTOLIC BLOOD PRESSURE: 120 MMHG | BODY MASS INDEX: 22.2 KG/M2 | DIASTOLIC BLOOD PRESSURE: 80 MMHG

## 2024-11-26 DIAGNOSIS — J32.9 SINUSITIS, UNSPECIFIED CHRONICITY, UNSPECIFIED LOCATION: Primary | ICD-10-CM

## 2024-11-26 DIAGNOSIS — J45.990 EXERCISE-INDUCED ASTHMA: ICD-10-CM

## 2024-11-26 PROBLEM — F32.2 SEVERE MAJOR DEPRESSIVE DISORDER (HCC): Status: ACTIVE | Noted: 2024-11-26

## 2024-11-26 PROCEDURE — 99213 OFFICE O/P EST LOW 20 MIN: CPT | Performed by: NURSE PRACTITIONER

## 2024-11-26 RX ORDER — AMOXICILLIN 875 MG/1
875 TABLET, COATED ORAL 2 TIMES DAILY
Qty: 14 TABLET | Refills: 0 | Status: SHIPPED | OUTPATIENT
Start: 2024-11-26 | End: 2024-12-03

## 2024-11-26 NOTE — PROGRESS NOTES
Name: Pacheco Willis      : 2000      MRN: [id number]  Encounter Provider: ARELIS Ambriz  Encounter Date: 2024   Encounter department: [facility]  :  Assessment & Plan  Sinusitis, unspecified chronicity, unspecified location  Start amoxicillin 1 tablet twice daily for 7 days, with food.   Call for any persisting or worsening symptoms.     Orders:    amoxicillin (AMOXIL) 875 mg tablet; Take 1 tablet (875 mg total) by mouth 2 (two) times a day for 7 days    Exercise-induced asthma  Albuterol as needed.               History of Present Illness     Pacheco Willis is a 24 year old female presenting today for symptoms.     Symptoms started Friday, 4 days ago.    No chills, sweats, fevers.     Rhinorrhea  Sneezing  Post nasal drip  Maxillary sinus pressure.   Fatigue  No cough  No headache  Nasal congestion  No sore throat  No ear pain.     No shortness of breath, tightness or wheezing.     No known sick contacts.   No one at home is sick.     Using Nyquil and Tylenol.     Fever today is new.     Accompanied by dad today.             Review of Systems   Constitutional:  Positive for fatigue. Negative for chills, diaphoresis and fever.   HENT:  Positive for congestion, postnasal drip, rhinorrhea, sinus pressure and sneezing. Negative for ear pain and sore throat.    Respiratory:  Negative for cough.    Cardiovascular: Negative.    Gastrointestinal: Negative.    Musculoskeletal:  Negative for myalgias.   Skin:  Negative for rash.   Neurological:  Negative for headaches.     Current Outpatient Medications on File Prior to Visit   Medication Sig Dispense Refill    albuterol (PROVENTIL HFA,VENTOLIN HFA) 90 mcg/act inhaler Inhale 2 puffs every 6 (six) hours as needed for wheezing or shortness of breath 8 g 0    fluticasone (FLONASE) 50 mcg/act nasal spray 2 sprays into each nostril daily 16 g 0    hydrOXYzine HCL (ATARAX) 10 mg tablet Take 1 tablet (10 mg total) by mouth 3  "(three) times a day as needed for anxiety 30 tablet 0    norethindrone (Adrianne) 0.35 MG tablet Take 1 tablet (0.35 mg total) by mouth daily 84 tablet 1     No current facility-administered medications on file prior to visit.         Objective   /80   Pulse (!) 107   Temp 100.1 °F (37.8 °C) (Tympanic)   Resp 16   Ht 5' 4\" (1.626 m)   Wt 59 kg (130 lb)   LMP 11/18/2024 (Approximate)   SpO2 97%   BMI 22.31 kg/m²      Physical Exam  Vitals and nursing note reviewed.   Constitutional:       General: She is not in acute distress.     Appearance: Normal appearance. She is ill-appearing.   HENT:      Head: Atraumatic.      Right Ear: Tympanic membrane normal.      Left Ear: Tympanic membrane normal.      Nose:      Right Sinus: No maxillary sinus tenderness or frontal sinus tenderness.      Left Sinus: No maxillary sinus tenderness or frontal sinus tenderness.      Comments: Red swollen turbinates.      Mouth/Throat:      Mouth: Mucous membranes are moist.      Comments: Thick, post nasal drip  Cardiovascular:      Rate and Rhythm: Normal rate and regular rhythm.      Heart sounds: No murmur heard.  Pulmonary:      Effort: Pulmonary effort is normal. No respiratory distress.      Breath sounds: Normal breath sounds. No wheezing or rales.   Musculoskeletal:      Cervical back: Normal range of motion and neck supple.   Lymphadenopathy:      Cervical: No cervical adenopathy.   Neurological:      Mental Status: She is alert.         "

## 2025-03-11 DIAGNOSIS — Z30.011 ENCOUNTER FOR INITIAL PRESCRIPTION OF CONTRACEPTIVE PILLS: ICD-10-CM

## 2025-03-11 RX ORDER — ACETAMINOPHEN AND CODEINE PHOSPHATE 120; 12 MG/5ML; MG/5ML
1 SOLUTION ORAL DAILY
Qty: 84 TABLET | Refills: 0 | OUTPATIENT
Start: 2025-03-11

## 2025-04-25 ENCOUNTER — OFFICE VISIT (OUTPATIENT)
Dept: FAMILY MEDICINE CLINIC | Facility: CLINIC | Age: 25
End: 2025-04-25
Payer: COMMERCIAL

## 2025-04-25 VITALS
OXYGEN SATURATION: 98 % | SYSTOLIC BLOOD PRESSURE: 120 MMHG | DIASTOLIC BLOOD PRESSURE: 80 MMHG | HEIGHT: 64 IN | RESPIRATION RATE: 16 BRPM | BODY MASS INDEX: 24.24 KG/M2 | TEMPERATURE: 98.4 F | HEART RATE: 112 BPM | WEIGHT: 142 LBS

## 2025-04-25 DIAGNOSIS — F33.0 MILD EPISODE OF RECURRENT MAJOR DEPRESSIVE DISORDER (HCC): ICD-10-CM

## 2025-04-25 DIAGNOSIS — J45.990 EXERCISE-INDUCED ASTHMA: ICD-10-CM

## 2025-04-25 DIAGNOSIS — L70.0 ACNE VULGARIS: ICD-10-CM

## 2025-04-25 DIAGNOSIS — G47.00 INSOMNIA, UNSPECIFIED TYPE: ICD-10-CM

## 2025-04-25 DIAGNOSIS — Z23 ENCOUNTER FOR IMMUNIZATION: ICD-10-CM

## 2025-04-25 DIAGNOSIS — Z00.00 PHYSICAL EXAM, ANNUAL: Primary | ICD-10-CM

## 2025-04-25 DIAGNOSIS — M67.40 GANGLION CYST: ICD-10-CM

## 2025-04-25 PROCEDURE — 90471 IMMUNIZATION ADMIN: CPT

## 2025-04-25 PROCEDURE — 99214 OFFICE O/P EST MOD 30 MIN: CPT | Performed by: NURSE PRACTITIONER

## 2025-04-25 PROCEDURE — 99395 PREV VISIT EST AGE 18-39: CPT | Performed by: NURSE PRACTITIONER

## 2025-04-25 PROCEDURE — 90715 TDAP VACCINE 7 YRS/> IM: CPT

## 2025-04-25 RX ORDER — TRAZODONE HYDROCHLORIDE 50 MG/1
TABLET ORAL
Qty: 60 TABLET | Refills: 0 | Status: SHIPPED | OUTPATIENT
Start: 2025-04-25

## 2025-04-25 RX ORDER — ADAPALENE 45 G/G
GEL TOPICAL
Qty: 45 G | Refills: 3 | Status: SHIPPED | OUTPATIENT
Start: 2025-04-25

## 2025-04-25 NOTE — PROGRESS NOTES
Adult Annual Physical  Name: Pacheco Willis      : 2000      MRN: [id number]  Encounter Provider: ARELIS Ambriz  Encounter Date: 2025   Encounter department: [facility]    :  Assessment & Plan  Physical exam, annual         Exercise-induced asthma  No recent use of albuterol inhaler, no recent regular exercise.        Mild episode of recurrent major depressive disorder (HCC)  Stable mood with out medications. Previously took sertraline.          Insomnia, unspecified type  Shift work, has difficulty staying asleep and falling asleep during the daytime hours.   Will trial Trazodone. She is trying to change to a day shift position.   She will let me know if not effective.     Orders:  •  traZODone (DESYREL) 50 mg tablet; Take 1-2 tablets every day as needed for sleep.    Ganglion cyst  Right  volar radial wrist with cyst, suspect ganglion cyst, will complete US.  Orders:  •  US extremity soft tissue; Future    Acne vulgaris  Try Differin Gel 0.1%, apply once daily at bedtime.   Wash face with Neutragena facial cleanser twice daily   If skin is getting dry with differin gel, reduce use to every other day.   Let me know in 1 month if not improving.      Orders:  •  adapalene (DIFFERIN) 0.1 % gel; Apply topically daily at bedtime    Encounter for immunization    Orders:  •  TDAP VACCINE GREATER THAN OR EQUAL TO 6YO IM        Follow up in one year for annual physical or sooner if needed.     Preventive Screenings:  - Diabetes Screening: screening up-to-date  - Chlamydia Screening: screening up-to-date   - Cervical cancer screening: screening up-to-date   - Colon cancer screening: screening not indicated   - Lung cancer screening: screening not indicated     Immunizations:  - Immunizations due: Prevnar 20 and HPV (Gardasil 9)    Counseling/Anticipatory Guidance:    - Dental health: discussed importance of regular tooth brushing, flossing, and dental visits.   - Sexual health:  discussed sexually transmitted diseases, partner selection, use of condoms, avoidance of unintended pregnancy, and contraceptive alternatives.   - Diet: discussed recommendations for a healthy/well-balanced diet.   - Exercise: the importance of regular exercise/physical activity was discussed. Recommend exercise 3-5 times per week for at least 30 minutes.       Depression Screening and Follow-up Plan: Patient's depression screening was positive with a PHQ-9 score of 9.   Patient assessed for underlying major depression. Brief counseling provided and recommend additional follow-up/re-evaluation next office visit.         History of Present Illness     Adult Annual Physical:  Patient presents for annual physical. Pacheco Willis is a 24 year old female presenting today for annual exam.     Shift work--works night shift.   Wakes up every hour.  Goes to bed 5-6 am--wakes at noon.   Would like to try sleep med, unitl shift can change.   Trying to change to day shift.     OCP--progesterone only. if doesn't take it exact time will bleed for 2-3 weeks.   Has routine down and this last month was good.   Did not tolerate combined OCP due to side effects--headaches, mood changes.     Mood up and down.   Satisfied with it currently.     Wants to start walking for exercise.    Right radial wrist volar cyst. Tender at times.   Right handed.    Acne, using Neutragena face wash.  Washes face once per day with water, once per day with face wash.    .     Diet and Physical Activity:  - Diet/Nutrition: no special diet.  - Exercise: no formal exercise.    Depression Screening:    - PHQ-9 Score: 9    General Health:  - Sleep: sleeps poorly and unrefreshing sleep. weekends, sleeps well. Working night shift. Hoping to change shifts.  - Hearing: normal hearing bilateral ears.  - Vision: most recent eye exam > 1 year ago and no vision problems.  - Dental: regular dental visits.    /GYN Health:  - Follows with GYN: yes.   - Menopause:  "premenopausal.   - Last menstrual cycle: 8/22/2024.   - History of STDs: no  - Contraception: oral contraceptives.      Advanced Care Planning:  - Has an advanced directive?: no    - Has a durable medical POA?: no      Review of Systems   Constitutional: Negative.    HENT: Negative.     Respiratory: Negative.     Cardiovascular: Negative.    Gastrointestinal: Negative.    Genitourinary: Negative.    Musculoskeletal:         Right volar radial wrist cyst   Skin:         Facial acne.   Neurological: Negative.    Hematological: Negative.    Psychiatric/Behavioral:  Positive for sleep disturbance. Negative for dysphoric mood. The patient is not nervous/anxious.          Objective   /80   Pulse (!) 112   Temp 98.4 °F (36.9 °C) (Temporal)   Resp 16   Ht 5' 4\" (1.626 m)   Wt 64.4 kg (142 lb)   LMP 08/25/2024 (Exact Date)   SpO2 98%   BMI 24.37 kg/m²     Physical Exam  Vitals and nursing note reviewed.   Constitutional:       General: She is not in acute distress.     Appearance: Normal appearance. She is not ill-appearing.   HENT:      Head: Normocephalic and atraumatic.      Right Ear: Tympanic membrane and ear canal normal.      Left Ear: Tympanic membrane and ear canal normal.      Mouth/Throat:      Mouth: Mucous membranes are moist.      Pharynx: Oropharynx is clear.   Eyes:      Conjunctiva/sclera: Conjunctivae normal.      Pupils: Pupils are equal, round, and reactive to light.   Neck:      Thyroid: No thyromegaly.   Cardiovascular:      Rate and Rhythm: Normal rate and regular rhythm.      Heart sounds: No murmur heard.  Pulmonary:      Effort: Pulmonary effort is normal. No respiratory distress.      Breath sounds: Normal breath sounds. No wheezing or rales.   Abdominal:      General: Abdomen is flat. Bowel sounds are normal.      Palpations: Abdomen is soft.      Tenderness: There is no abdominal tenderness. There is no guarding.   Musculoskeletal:         General: No deformity.      Cervical back: " Normal range of motion and neck supple.      Right lower leg: No edema.      Left lower leg: No edema.      Comments: Right volar radial wrist <1 cm wrist palpable, visible cyst.   No skin color changes, tender with palpation.    Lymphadenopathy:      Cervical: No cervical adenopathy.   Skin:     General: Skin is warm and dry.      Findings: No rash.      Comments: Facial acne   Neurological:      Mental Status: She is alert.      Gait: Gait normal.   Psychiatric:         Mood and Affect: Mood normal.         Current Outpatient Medications:   •  adapalene (DIFFERIN) 0.1 % gel, Apply topically daily at bedtime, Disp: 45 g, Rfl: 3  •  albuterol (PROVENTIL HFA,VENTOLIN HFA) 90 mcg/act inhaler, Inhale 2 puffs every 6 (six) hours as needed for wheezing or shortness of breath, Disp: 8 g, Rfl: 0  •  fluticasone (FLONASE) 50 mcg/act nasal spray, 2 sprays into each nostril daily, Disp: 16 g, Rfl: 0  •  norethindrone (Adrianne) 0.35 MG tablet, Take 1 tablet (0.35 mg total) by mouth daily, Disp: 84 tablet, Rfl: 1  •  traZODone (DESYREL) 50 mg tablet, Take 1-2 tablets every day as needed for sleep., Disp: 60 tablet, Rfl: 0

## 2025-05-01 ENCOUNTER — HOSPITAL ENCOUNTER (OUTPATIENT)
Dept: ULTRASOUND IMAGING | Facility: HOSPITAL | Age: 25
Discharge: HOME | End: 2025-05-01
Attending: NURSE PRACTITIONER
Payer: COMMERCIAL

## 2025-05-01 ENCOUNTER — RESULTS FOLLOW-UP (OUTPATIENT)
Dept: FAMILY MEDICINE CLINIC | Facility: CLINIC | Age: 25
End: 2025-05-01

## 2025-05-01 DIAGNOSIS — M67.40 GANGLION CYST: ICD-10-CM

## 2025-05-01 DIAGNOSIS — M67.40 GANGLION CYST: Primary | ICD-10-CM

## 2025-05-01 PROCEDURE — 76882 US LMTD JT/FCL EVL NVASC XTR: CPT

## 2025-05-01 NOTE — TELEPHONE ENCOUNTER
----- Message from ARELIS Hoffmann sent at 5/1/2025  2:40 PM EDT -----  Please let Pacheco know the ultrasound of her wrist shows a ganglion cyst.   If it continues to be bothersome to her, I recommend she see hand specialist, Dr. Cortés regarding this.

## 2025-05-12 ENCOUNTER — OFFICE VISIT (OUTPATIENT)
Dept: OBGYN CLINIC | Facility: HOSPITAL | Age: 25
End: 2025-05-12
Payer: COMMERCIAL

## 2025-05-12 VITALS — BODY MASS INDEX: 25.16 KG/M2 | WEIGHT: 142 LBS | HEIGHT: 63 IN

## 2025-05-12 DIAGNOSIS — M67.431 GANGLION CYST OF VOLAR ASPECT OF RIGHT WRIST: Primary | ICD-10-CM

## 2025-05-12 PROCEDURE — 99243 OFF/OP CNSLTJ NEW/EST LOW 30: CPT | Performed by: SURGERY

## 2025-05-12 NOTE — PATIENT INSTRUCTIONS
What is a Ganglion Cyst?  Ganglion cysts are very common lumps within the hand and wrist that occur adjacent to joints or tendons. The most common locations are the top of the wrist (see Figure 1), the palm side of the wrist, the base of the finger on the palm side, and the top of the far joint of the finger (see Figure 2). The ganglion cyst often resembles a water balloon on a stalk (see Figure 3), and is filled with clear fluid or gel. These cysts may change in size or even disappear completely, and they may or may not be painful. They are not cancerous and will not spread to other areas, but some people form cysts at multiple locations.      Causes  The cause of these cysts is unknown, although they may form in the presence of joint or tendon irritation or mechanical changes. They occur in patients of all ages.    Diagnosis  The diagnosis is usually based on the location of the lump and its appearance. Ganglion cysts are usually oval or round and may be soft or firm. Cysts at the base of the finger on the palm side are typically very firm, pea-sized nodules that are tender to applied pressure, such as when gripping. Light will often pass through these lumps (transillumination), and this can assist in the diagnosis. Your physician may request x-rays in order to look for evidence of problems in adjacent joints. Cysts at the far joint of the finger frequently have an arthritic bone spur -which is a small bony bump or projection- associated with them, the overlying skin may become thin, and there may be a lengthwise groove in the fingernail just beyond the cyst.    Treatment  Treatment can often be non-surgical. In many cases, the cysts can simply be observed, especially if they are painless, because they frequently disappear spontaneously. If the cyst becomes painful, limits activity, or is otherwise unacceptable, several treatment options are available. The use of splints and anti-inflammatory medication can be  prescribed in order to decrease pain associated with activities. An aspiration can be performed to remove the fluid from the cyst and decompress it. This requires placing a needle into the cyst, which can be performed in most office settings. Aspiration is a very simple procedure, but recurrence of the cyst is common. If non-surgical options fail to provide relief or if the cyst recurs, surgical alternatives are available. Surgery involves removing the cyst along with a portion of the joint capsule or tendon sheath (see Figure 3). In the case of wrist ganglion cysts, both traditional open and arthroscopic techniques usually yield good results. Surgical treatment is generally successful although cysts may recur. If there is any question about the diagnosis, excisional biopsy with a pathological examination will better define what the mass is. Your surgeon will discuss the best treatment options for you.    © 2012 American Society for [employer] of the Hand  www.handcare.org

## 2025-05-12 NOTE — PROGRESS NOTES
Flor Cortés M.D.  Attending, Orthopaedic Surgery  Hand, Wrist, and Elbow Surgery  [facility]      ORTHOPAEDIC HAND, WRIST, AND ELBOW OFFICE  VISIT       ASSESSMENT/PLAN:        Assessment & Plan  Ganglion cyst of volar aspect of right wrist  US reviewed demonstrating a small volar radial cyst   Discussed treatment options including observation vs aspiration vs surgical excision   At this size and location would recommend observation at this time unless it is particularly painful.   I do not suspect an aspiration will be significantly beneficial at this size, but if it does increase in size we can discuss either aspiration or surgical excision       Orders:    Ambulatory Referral to Orthopedic Surgery             The patient verbalized understanding of exam findings and treatment plan. We engaged in the shared decision-making process and treatment options were discussed at length with the patient. Surgical and conservative management discussed today along with risks and benefits.      Follow Up:  Return if symptoms worsen or fail to improve.    To Do Next Visit:  Re-evaluation of current issue      General Discussions:  Ganglion Cysts: The anatomy and physiology of the ganglion was discussed with the patient today in the office.  Fluid leaking out of the joint surface typically creates a small sac, which can enlarge and cause pain or limitation of motion.  Treatment options include observation, aspiration, or surgical incision were discussed with the patient today.  Observation typically lead to resolution and approximately 10% of patients, aspiration results in resolution of approximately 50% of patients, and surgical excision leads to resolution in approximately 97% of patients.  After discussion with the patient today, the patient voiced understanding of all treatment  options.      ____________________________________________________________________________________________________________________________________________      CHIEF COMPLAINT:  Chief Complaint   Patient presents with    Right Wrist - Ganglion Cyst     MSK 5/1/25       SUBJECTIVE:  Pacheco Willis is a 24 y.o. year old RHD female seen in consultation requested by Ying INFANTE who presents for evaluation of a right volar radial ganglion cyst. She states the mass has been present for about a year and was larger at first but has fluctuated in size over time. She denies trauma to the area. The mass is bothersome at terminal motion and occasionally at rest, otherwise no significant pain.      I have personally reviewed all the relevant PMH, PSH, SH, FH, Medications and allergies      PAST MEDICAL HISTORY:  Past Medical History:   Diagnosis Date    Asthma N/A    Heart murmur     In-toeing     Leg length discrepancy        PAST SURGICAL HISTORY:  Past Surgical History:   Procedure Laterality Date    TONSILLECTOMY         FAMILY HISTORY:  Family History   Problem Relation Age of Onset    Mental illness Mother         anxiety, depression    Hypothyroidism Mother     Hypertension Father     Coronary artery disease Father     Other Father         Hypoalbuminemia    Mental illness Sister         anxiety, depression    Autism spectrum disorder Brother     Heart attack Paternal Grandfather     Diabetes Paternal Grandfather     Diabetes Family     Heart disease Family     Obesity Family        SOCIAL HISTORY:  Social History     Tobacco Use    Smoking status: Never    Smokeless tobacco: Never   Vaping Use    Vaping status: Never Used   Substance Use Topics    Alcohol use: Yes     Comment: social    Drug use: No       MEDICATIONS:    Current Outpatient Medications:     adapalene (DIFFERIN) 0.1 % gel, Apply topically daily at bedtime, Disp: 45 g, Rfl: 3    albuterol (PROVENTIL HFA,VENTOLIN HFA) 90 mcg/act inhaler, Inhale 2  puffs every 6 (six) hours as needed for wheezing or shortness of breath, Disp: 8 g, Rfl: 0    fluticasone (FLONASE) 50 mcg/act nasal spray, 2 sprays into each nostril daily, Disp: 16 g, Rfl: 0    norethindrone (Adrianne) 0.35 MG tablet, Take 1 tablet (0.35 mg total) by mouth daily, Disp: 84 tablet, Rfl: 1    traZODone (DESYREL) 50 mg tablet, Take 1-2 tablets every day as needed for sleep., Disp: 60 tablet, Rfl: 0    ALLERGIES:  Allergies   Allergen Reactions    Pollen Extract     Short Ragweed Pollen Ext            REVIEW OF SYSTEMS:  Review of Systems   Constitutional:  Negative for chills and fever.   HENT:  Negative for ear pain and sore throat.    Eyes:  Negative for pain and visual disturbance.   Respiratory:  Negative for cough and shortness of breath.    Cardiovascular:  Negative for chest pain and palpitations.   Gastrointestinal:  Negative for abdominal pain and vomiting.   Genitourinary:  Negative for dysuria and hematuria.   Musculoskeletal:  Positive for arthralgias. Negative for back pain.   Skin:  Negative for color change and rash.   Neurological:  Negative for seizures and syncope.   All other systems reviewed and are negative.      VITALS:  There were no vitals filed for this visit.    LABS:      _____________________________________________________  PHYSICAL EXAMINATION:  General: well developed and well nourished, alert, oriented times 3, and appears comfortable  Psychiatric: Normal  HEENT: Normocephalic, Atraumatic Trachea Midline, No torticollis  Pulmonary: No audible wheezing or respiratory distress   Abdomen/GI: Non tender, non distended   Cardiovascular: No pitting edema, 2+ radial pulse   Skin: No masses, erythema, lacerations, fluctation, ulcerations  Neurovascular: Sensation Intact to the Median, Ulnar, Radial Nerve, Motor Intact to the Median, Ulnar, Radial Nerve, and Pulses Intact  Musculoskeletal: Normal, except as noted in detailed exam and in HPI.      MUSCULOSKELETAL  EXAMINATION:    Right wrist:   Skin intact  Small volar radial ganglion cyst palpable with deep extension of the wrist   Wrist motion is full and functional   Sensation intact  ___________________________________________________  STUDIES REVIEWED:  I have personally reviewed and interpreted  US of the right wrist which demonstrate:  There is a tiny hypoechoic nodule measuring 0.5 x 0.2 x 0.5 cm in the right volar wrist at the site of clinical concern adjacent to a flexor tendon. This nodule appears to have a communication into the joint. There is no internal vascularity. Therefore,   these findings most likely reflect a tiny complex cystic ganglion      LABS REVIEWED:    HgA1c:   Lab Results   Component Value Date    HGBA1C 4.8 06/14/2024     BMP:   Lab Results   Component Value Date    CALCIUM 9.4 06/14/2024     07/22/2016    K 3.6 06/14/2024    CO2 28 06/14/2024     06/14/2024    BUN 15 06/14/2024    CREATININE 0.62 06/14/2024               PROCEDURES PERFORMED:  Procedures  No Procedures performed today    _____________________________________________________      Scribe Attestation      I,:  Kimberley Colindres PA-C am acting as a scribe while in the presence of the attending physician.:       I,:  Flor Cortés MD personally performed the services described in this documentation    as scribed in my presence.:

## 2025-05-17 DIAGNOSIS — G47.00 INSOMNIA, UNSPECIFIED TYPE: ICD-10-CM

## 2025-05-19 RX ORDER — TRAZODONE HYDROCHLORIDE 50 MG/1
TABLET ORAL
Qty: 60 TABLET | Refills: 3 | Status: SHIPPED | OUTPATIENT
Start: 2025-05-19

## 2025-06-15 DIAGNOSIS — Z30.011 ENCOUNTER FOR INITIAL PRESCRIPTION OF CONTRACEPTIVE PILLS: ICD-10-CM

## 2025-06-16 RX ORDER — NORETHINDRONE 0.35 MG/1
1 TABLET ORAL DAILY
Qty: 28 TABLET | Refills: 1 | Status: SHIPPED | OUTPATIENT
Start: 2025-06-16